# Patient Record
Sex: FEMALE | Race: OTHER | NOT HISPANIC OR LATINO
[De-identification: names, ages, dates, MRNs, and addresses within clinical notes are randomized per-mention and may not be internally consistent; named-entity substitution may affect disease eponyms.]

---

## 2017-01-25 ENCOUNTER — APPOINTMENT (OUTPATIENT)
Dept: NEPHROLOGY | Facility: CLINIC | Age: 22
End: 2017-01-25

## 2017-01-25 VITALS
HEART RATE: 87 BPM | SYSTOLIC BLOOD PRESSURE: 104 MMHG | BODY MASS INDEX: 24.61 KG/M2 | WEIGHT: 138.89 LBS | HEIGHT: 63 IN | DIASTOLIC BLOOD PRESSURE: 66 MMHG | OXYGEN SATURATION: 97 %

## 2017-01-25 DIAGNOSIS — R82.90 UNSPECIFIED ABNORMAL FINDINGS IN URINE: ICD-10-CM

## 2017-01-25 RX ORDER — FOLIC ACID 1 MG/1
1 TABLET ORAL
Qty: 30 | Refills: 0 | Status: ACTIVE | COMMUNITY
Start: 2017-01-05

## 2017-01-25 RX ORDER — RAMIPRIL 1.25 MG/1
1.25 CAPSULE ORAL
Qty: 30 | Refills: 0 | Status: ACTIVE | COMMUNITY
Start: 2016-09-19

## 2017-01-26 LAB
ALBUMIN SERPL ELPH-MCNC: 4 G/DL
ANION GAP SERPL CALC-SCNC: 12 MMOL/L
APPEARANCE: CLEAR
BACTERIA: ABNORMAL
BILIRUBIN URINE: NEGATIVE
BLOOD URINE: ABNORMAL
BUN SERPL-MCNC: 14 MG/DL
CALCIUM OXALATE CRYSTALS: ABNORMAL
CALCIUM SERPL-MCNC: 9.3 MG/DL
CHLORIDE SERPL-SCNC: 103 MMOL/L
CO2 SERPL-SCNC: 26 MMOL/L
COLOR: YELLOW
CREAT SERPL-MCNC: 0.52 MG/DL
CREAT SPEC-SCNC: 138 MG/DL
CREAT/PROT UR: 0.5 RATIO
GLUCOSE QUALITATIVE U: NORMAL MG/DL
GLUCOSE SERPL-MCNC: 111 MG/DL
HYALINE CASTS: 0 /LPF
INR PPP: 1.67 RATIO
KETONES URINE: NEGATIVE
LEUKOCYTE ESTERASE URINE: NEGATIVE
MICROSCOPIC-UA: NORMAL
NITRITE URINE: NEGATIVE
PH URINE: 6.5
PHOSPHATE SERPL-MCNC: 3.5 MG/DL
POTASSIUM SERPL-SCNC: 4.6 MMOL/L
PROT UR-MCNC: 75 MG/DL
PROTEIN URINE: 100 MG/DL
PT BLD: 19 SEC
RED BLOOD CELLS URINE: 8 /HPF
SODIUM SERPL-SCNC: 141 MMOL/L
SPECIFIC GRAVITY URINE: 1.02
SQUAMOUS EPITHELIAL CELLS: 4 /HPF
UROBILINOGEN URINE: NORMAL MG/DL
WHITE BLOOD CELLS URINE: 8 /HPF

## 2017-01-27 LAB — BACTERIA UR CULT: ABNORMAL

## 2017-02-13 ENCOUNTER — OTHER (OUTPATIENT)
Age: 22
End: 2017-02-13

## 2017-02-13 RX ORDER — MYCOPHENOLATE MOFETIL 500 MG/1
500 TABLET ORAL TWICE DAILY
Qty: 120 | Refills: 2 | Status: ACTIVE | COMMUNITY
Start: 2016-09-19 | End: 1900-01-01

## 2017-04-26 ENCOUNTER — APPOINTMENT (OUTPATIENT)
Dept: MRI IMAGING | Facility: CLINIC | Age: 22
End: 2017-04-26

## 2017-04-26 ENCOUNTER — OUTPATIENT (OUTPATIENT)
Dept: OUTPATIENT SERVICES | Facility: HOSPITAL | Age: 22
LOS: 1 days | End: 2017-04-26
Payer: COMMERCIAL

## 2017-04-26 DIAGNOSIS — Z00.8 ENCOUNTER FOR OTHER GENERAL EXAMINATION: ICD-10-CM

## 2017-04-26 PROCEDURE — A9585: CPT

## 2017-04-26 PROCEDURE — 82565 ASSAY OF CREATININE: CPT

## 2017-04-26 PROCEDURE — 70553 MRI BRAIN STEM W/O & W/DYE: CPT

## 2017-05-30 ENCOUNTER — APPOINTMENT (OUTPATIENT)
Dept: NEPHROLOGY | Facility: CLINIC | Age: 22
End: 2017-05-30

## 2017-05-30 VITALS
WEIGHT: 130.07 LBS | OXYGEN SATURATION: 98 % | HEIGHT: 63 IN | DIASTOLIC BLOOD PRESSURE: 82 MMHG | HEART RATE: 83 BPM | BODY MASS INDEX: 23.05 KG/M2 | SYSTOLIC BLOOD PRESSURE: 107 MMHG

## 2017-05-30 DIAGNOSIS — M32.14 GLOMERULAR DISEASE IN SYSTEMIC LUPUS ERYTHEMATOSUS: ICD-10-CM

## 2017-05-30 DIAGNOSIS — R76.0 RAISED ANTIBODY TITER: ICD-10-CM

## 2017-05-30 RX ORDER — BUTALBITAL, ACETAMINOPHEN AND CAFFEINE 325; 50; 40 MG/1; MG/1; MG/1
50-325-40 TABLET ORAL
Qty: 40 | Refills: 0 | Status: ACTIVE | COMMUNITY
Start: 2017-05-30

## 2017-05-31 LAB
ALBUMIN SERPL ELPH-MCNC: 4 G/DL
ANION GAP SERPL CALC-SCNC: 18 MMOL/L
APPEARANCE: ABNORMAL
BACTERIA: ABNORMAL
BASOPHILS # BLD AUTO: 0.02 K/UL
BASOPHILS NFR BLD AUTO: 0.4 %
BILIRUBIN URINE: NEGATIVE
BLOOD URINE: ABNORMAL
BUN SERPL-MCNC: 13 MG/DL
CALCIUM OXALATE CRYSTALS: ABNORMAL
CALCIUM SERPL-MCNC: 9.4 MG/DL
CHLORIDE SERPL-SCNC: 101 MMOL/L
CO2 SERPL-SCNC: 23 MMOL/L
COLOR: ABNORMAL
CREAT SERPL-MCNC: 0.61 MG/DL
CREAT SPEC-SCNC: 372 MG/DL
CREAT/PROT UR: 0.3 RATIO
EOSINOPHIL # BLD AUTO: 0.1 K/UL
EOSINOPHIL NFR BLD AUTO: 1.8 %
GLUCOSE QUALITATIVE U: NORMAL MG/DL
GLUCOSE SERPL-MCNC: 76 MG/DL
GRANULAR CASTS: 1 /LPF
HCT VFR BLD CALC: 44.1 %
HGB BLD-MCNC: 14.5 G/DL
HYALINE CASTS: 1 /LPF
IMM GRANULOCYTES NFR BLD AUTO: 0.5 %
INR PPP: 2.14 RATIO
KETONES URINE: ABNORMAL
LEUKOCYTE ESTERASE URINE: NEGATIVE
LYMPHOCYTES # BLD AUTO: 2.38 K/UL
LYMPHOCYTES NFR BLD AUTO: 42.2 %
MAN DIFF?: NORMAL
MCHC RBC-ENTMCNC: 29.9 PG
MCHC RBC-ENTMCNC: 32.9 GM/DL
MCV RBC AUTO: 90.9 FL
MICROSCOPIC-UA: NORMAL
MONOCYTES # BLD AUTO: 0.56 K/UL
MONOCYTES NFR BLD AUTO: 9.9 %
NEUTROPHILS # BLD AUTO: 2.55 K/UL
NEUTROPHILS NFR BLD AUTO: 45.2 %
NITRITE URINE: NEGATIVE
PH URINE: 6
PHOSPHATE SERPL-MCNC: 3.1 MG/DL
PLATELET # BLD AUTO: 325 K/UL
POTASSIUM SERPL-SCNC: 4.4 MMOL/L
PROT UR-MCNC: 104 MG/DL
PROTEIN URINE: 300 MG/DL
PT BLD: 24.6 SEC
RBC # BLD: 4.85 M/UL
RBC # FLD: 13.2 %
RED BLOOD CELLS URINE: 32 /HPF
SODIUM SERPL-SCNC: 142 MMOL/L
SPECIFIC GRAVITY URINE: 1.04
SQUAMOUS EPITHELIAL CELLS: 12 /HPF
UROBILINOGEN URINE: 1 MG/DL
WBC # FLD AUTO: 5.64 K/UL
WHITE BLOOD CELLS URINE: 66 /HPF

## 2017-06-01 LAB
ALBUMIN SERPL ELPH-MCNC: 4 G/DL
ALP BLD-CCNC: 55 U/L
ALT SERPL-CCNC: 22 U/L
AST SERPL-CCNC: 20 U/L
BILIRUB DIRECT SERPL-MCNC: 0 MG/DL
BILIRUB INDIRECT SERPL-MCNC: NORMAL
BILIRUB SERPL-MCNC: <0.2 MG/DL
PROT SERPL-MCNC: 6.5 G/DL

## 2020-11-11 ENCOUNTER — EMERGENCY (EMERGENCY)
Facility: HOSPITAL | Age: 25
LOS: 1 days | Discharge: HOME | End: 2020-11-11
Attending: EMERGENCY MEDICINE | Admitting: EMERGENCY MEDICINE
Payer: COMMERCIAL

## 2020-11-11 VITALS
SYSTOLIC BLOOD PRESSURE: 108 MMHG | HEIGHT: 63.5 IN | RESPIRATION RATE: 16 BRPM | TEMPERATURE: 98 F | HEART RATE: 83 BPM | DIASTOLIC BLOOD PRESSURE: 68 MMHG | OXYGEN SATURATION: 100 %

## 2020-11-11 PROCEDURE — 99283 EMERGENCY DEPT VISIT LOW MDM: CPT

## 2020-11-11 RX ORDER — DIAZEPAM 5 MG
1 TABLET ORAL
Qty: 10 | Refills: 0
Start: 2020-11-11 | End: 2020-11-15

## 2020-11-11 RX ORDER — DIAZEPAM 5 MG
10 TABLET ORAL ONCE
Refills: 0 | Status: DISCONTINUED | OUTPATIENT
Start: 2020-11-11 | End: 2020-11-11

## 2020-11-11 RX ORDER — IBUPROFEN 200 MG
600 TABLET ORAL ONCE
Refills: 0 | Status: COMPLETED | OUTPATIENT
Start: 2020-11-11 | End: 2020-11-11

## 2020-11-11 RX ORDER — LIDOCAINE 4 G/100G
1 CREAM TOPICAL ONCE
Refills: 0 | Status: COMPLETED | OUTPATIENT
Start: 2020-11-11 | End: 2020-11-11

## 2020-11-11 RX ORDER — ACETAMINOPHEN 500 MG
975 TABLET ORAL ONCE
Refills: 0 | Status: COMPLETED | OUTPATIENT
Start: 2020-11-11 | End: 2020-11-11

## 2020-11-11 RX ADMIN — Medication 10 MILLIGRAM(S): at 16:18

## 2020-11-11 RX ADMIN — LIDOCAINE 1 PATCH: 4 CREAM TOPICAL at 16:18

## 2020-11-11 RX ADMIN — Medication 975 MILLIGRAM(S): at 16:18

## 2020-11-11 RX ADMIN — Medication 600 MILLIGRAM(S): at 16:18

## 2020-11-11 NOTE — ED PROVIDER NOTE - NS ED ROS FT
General: denies fever, chills, weight loss/weight gain.  HENT: denies nasal congestion, sore throat, rhinorrhea, ear pain.  Eyes: denies visual changes, blurred vision, eye discharge, eye redness.  Neck: denies neck pain, neck swelling.  CV: denies chest pain, palpitations.  Resp: denies difficulty breathing, cough.  Abdominal: denies nausea, vomiting, diarrhea, abdominal pain, blood in stool, dark stool.  MSK: low back pain.  Neuro: denies headaches, numbness, tingling, dizziness, lightheadedness.  Skin: denies rashes, cuts, bruises.  Hematologic: denies unexplained bruises.

## 2020-11-11 NOTE — ED PROVIDER NOTE - PROGRESS NOTE DETAILS
Asaf PGY2 - Reassessed pt, who is feeling improved.  Will send a few days of valium for breakthrough pain.  Instructed pt. to take tylenol, motrin, and lidoderm.  Pt. aware and agrees w/ plan.  Will f/u w/ rheumatologist.  All other questions and concerns have been addressed.  Pt. will be dc/d.

## 2020-11-11 NOTE — ED ADULT TRIAGE NOTE - CHIEF COMPLAINT QUOTE
pt here for left sided lower back pain x 2weeks. pt with hx of lupus, saw her rheumatologist and given prednisone and lyrica, pain has persisted. here for further evaluation. no fevers

## 2020-11-11 NOTE — ED ADULT NURSE NOTE - OBJECTIVE STATEMENT
Pt. is A&Ox3 presents to ER c/o lower back pain. Pt. states this happens once a year from lupus. Pt. states pain worsened over the night, was not bearable. Pt. respirations even and unlabored, abdomen nontender and nondistended, skin intact. Pt. medicated as ordered, will CTM.

## 2020-11-11 NOTE — ED PROVIDER NOTE - PATIENT PORTAL LINK FT
You can access the FollowMyHealth Patient Portal offered by [facility] by registering at the following website: [email or web address]. By joining [email or web address]’s FollowMyHealth portal, you will also be able to view your health information using other applications (apps) compatible with our system.

## 2020-11-11 NOTE — ED PROVIDER NOTE - CLINICAL SUMMARY MEDICAL DECISION MAKING FREE TEXT BOX
25 y.o. F p/w low back pain.  Pain is similar in quality to previous lupus flare pain.  Took tylenol this morning w/o much relief.  Ambulating w/ mild difficulty, likely 2/2 pain.  Will give PO analgesics, reassess, and dc w/ rheum f/u if feeling better.

## 2020-11-11 NOTE — ED PROVIDER NOTE - PHYSICAL EXAMINATION
GENERAL: Patient awake alert NAD.  HEENT: NC/AT.  LUNGS: CTAB, no wheezes or crackles.  CARDIAC: RRR, no m/r/g.  ABDOMEN: Soft, NT, ND, No rebound, guarding.  EXT: No edema. No calf tenderness. CV 2+DP/PT bilaterally.  MSK: +b/l straight leg raise. No spinal tenderness, no vertebral step-offs, no pain with movement, no deformities.  NEURO: A&Ox3. Moving all extremities. Ambulating w/ mild difficulty.  SKIN: Warm and dry. No rash.  PSYCH: Normal affect.

## 2020-11-11 NOTE — ED PROVIDER NOTE - CARE PLAN
Principal Discharge DX:	Low back pain   Principal Discharge DX:	Low back pain  Secondary Diagnosis:	Systemic lupus erythematosus, unspecified SLE type, unspecified organ involvement status

## 2020-11-11 NOTE — ED PROVIDER NOTE - NSFOLLOWUPINSTRUCTIONS_ED_ALL_ED_FT
You were seen for low back pain.    This is due to your lupus flare.    Take tylenol 1000mg and motrin 400mg every 6 hours for pain.  Eat some food with the motrin.  You can also buy a lidocaine patch from any pharmacy over the counter.   You can use one patch for 12 hours.    I have also prescribed valium that you can use for breakthrough pain.  Do not operate any heavy machinery or  after taking this, as this medication can make you drowsy.    Back pain is very common in adults. The cause of back pain is rarely dangerous and the pain often gets better over time. The cause of your back pain may not be known and may include strain of muscles or ligaments, degeneration of the spinal disks, or arthritis. Occasionally the pain may radiate down your leg(s). Over-the-counter medicines to reduce pain and inflammation are often the most helpful. Stretching and remaining active frequently helps the healing process.     SEEK IMMEDIATE MEDICAL CARE IF YOU HAVE ANY OF THE FOLLOWING SYMPTOMS: bowel or bladder control problems, unusual weakness or numbness in your arms or legs, nausea or vomiting, abdominal pain, fever, dizziness/lightheadedness.

## 2020-11-11 NOTE — ED PROVIDER NOTE - ATTENDING CONTRIBUTION TO CARE
I have seen and examined the patient on the patient´s visit date. I have reviewed the note written by Suleman Ron DO  on that visit day. I have supervised and participated as necessary in the performance of procedures indicated for patient management and was available at all phases of the patient´s visit when needed. We discussed the history, physical exam findings, management plan, and  medical decision making. I have made my additions, exceptions, and revisions within the chart and I agree with H and P as documented in its entirety. The data and my interpretation of any data collected from labs, interventions and imaging appear below as well as my independent medical decision making and considerations    The patient is a 25y Female who has a past medical and surgery history of  migraine and SLE PTED with flare c/w prior Lupus Flares in the past which manifest with back pain myalgias and as described; pt says current symptoms are exactly the same as previous flares  Vital Signs Stable  PE: as described; my additions and exceptions are noted in the chart    IMPRESSION/RISK:  Dx=LUPUS flare; patient appears well and responded extremely well to meds   Plan  d/c with PO equivelents and outpt pcp/rheum followup  RTED PRN  Plan

## 2020-11-11 NOTE — ED PROVIDER NOTE - OBJECTIVE STATEMENT
25 y.o. F w/ PMHx of lupus p/w low back pain for over two weeks.  Pt. gets this pain once a year and usually is started on steroids and lyrica.  The steroids take a month to kick in and usually, she is able to tolerate the pain, but this year it has been intolerable.  Pt. called her rheumatologist who started her on methylprednisolone two weeks ago and then switched to prednisone last week.  Pt. was also started on lyrica last week.  States pain is similar in quality to previous pain, just more intense.  Pt. takes 1000mg extra strength tylenol every 4-6 hours, which has not provided much relief this year.  Denies any trauma to the area, saddle anesthesia, urinary/fecal incontinence, f/c, IVDA.  PT. has an appt w/ her rheumatologist next week.

## 2021-05-21 ENCOUNTER — EMERGENCY (EMERGENCY)
Facility: HOSPITAL | Age: 26
LOS: 1 days | Discharge: STILL ADMITTED OR ADMINISTRATIVE | End: 2021-05-21
Admitting: EMERGENCY MEDICINE
Payer: COMMERCIAL

## 2021-05-21 PROCEDURE — L9991: CPT

## 2021-07-01 ENCOUNTER — APPOINTMENT (OUTPATIENT)
Dept: RHEUMATOLOGY | Facility: CLINIC | Age: 26
End: 2021-07-01

## 2021-07-07 ENCOUNTER — NON-APPOINTMENT (OUTPATIENT)
Age: 26
End: 2021-07-07

## 2021-07-07 ENCOUNTER — APPOINTMENT (OUTPATIENT)
Dept: CARDIOLOGY | Facility: CLINIC | Age: 26
End: 2021-07-07
Payer: COMMERCIAL

## 2021-07-07 VITALS
WEIGHT: 152 LBS | HEIGHT: 63 IN | OXYGEN SATURATION: 97 % | TEMPERATURE: 98.5 F | BODY MASS INDEX: 26.93 KG/M2 | SYSTOLIC BLOOD PRESSURE: 100 MMHG | HEART RATE: 85 BPM | DIASTOLIC BLOOD PRESSURE: 70 MMHG

## 2021-07-07 DIAGNOSIS — G47.00 INSOMNIA, UNSPECIFIED: ICD-10-CM

## 2021-07-07 DIAGNOSIS — R06.02 SHORTNESS OF BREATH: ICD-10-CM

## 2021-07-07 DIAGNOSIS — Z82.49 FAMILY HISTORY OF ISCHEMIC HEART DISEASE AND OTHER DISEASES OF THE CIRCULATORY SYSTEM: ICD-10-CM

## 2021-07-07 DIAGNOSIS — I82.409 ACUTE EMBOLISM AND THROMBOSIS OF UNSPECIFIED DEEP VEINS OF UNSPECIFIED LOWER EXTREMITY: ICD-10-CM

## 2021-07-07 DIAGNOSIS — Z87.448 PERSONAL HISTORY OF OTHER DISEASES OF URINARY SYSTEM: ICD-10-CM

## 2021-07-07 DIAGNOSIS — Z13.228 ENCOUNTER FOR SCREENING FOR OTHER METABOLIC DISORDERS: ICD-10-CM

## 2021-07-07 DIAGNOSIS — Z83.79 FAMILY HISTORY OF OTHER DISEASES OF THE DIGESTIVE SYSTEM: ICD-10-CM

## 2021-07-07 DIAGNOSIS — R42 DIZZINESS AND GIDDINESS: ICD-10-CM

## 2021-07-07 DIAGNOSIS — Z86.59 PERSONAL HISTORY OF OTHER MENTAL AND BEHAVIORAL DISORDERS: ICD-10-CM

## 2021-07-07 DIAGNOSIS — F41.8 OTHER SPECIFIED ANXIETY DISORDERS: ICD-10-CM

## 2021-07-07 DIAGNOSIS — Z82.3 FAMILY HISTORY OF STROKE: ICD-10-CM

## 2021-07-07 DIAGNOSIS — Z87.39 PERSONAL HISTORY OF OTHER DISEASES OF THE MUSCULOSKELETAL SYSTEM AND CONNECTIVE TISSUE: ICD-10-CM

## 2021-07-07 DIAGNOSIS — Z86.718 PERSONAL HISTORY OF OTHER VENOUS THROMBOSIS AND EMBOLISM: ICD-10-CM

## 2021-07-07 DIAGNOSIS — M32.9 SYSTEMIC LUPUS ERYTHEMATOSUS, UNSPECIFIED: ICD-10-CM

## 2021-07-07 DIAGNOSIS — Z86.39 PERSONAL HISTORY OF OTHER ENDOCRINE, NUTRITIONAL AND METABOLIC DISEASE: ICD-10-CM

## 2021-07-07 DIAGNOSIS — Z83.438 FAMILY HISTORY OF OTHER DISORDER OF LIPOPROTEIN METABOLISM AND OTHER LIPIDEMIA: ICD-10-CM

## 2021-07-07 DIAGNOSIS — M79.7 FIBROMYALGIA: ICD-10-CM

## 2021-07-07 DIAGNOSIS — E55.9 VITAMIN D DEFICIENCY, UNSPECIFIED: ICD-10-CM

## 2021-07-07 PROCEDURE — 93000 ELECTROCARDIOGRAM COMPLETE: CPT

## 2021-07-07 PROCEDURE — 99214 OFFICE O/P EST MOD 30 MIN: CPT

## 2021-07-07 RX ORDER — PREGABALIN 100 MG/1
100 CAPSULE ORAL
Refills: 0 | Status: ACTIVE | COMMUNITY

## 2021-07-07 RX ORDER — TIZANIDINE HYDROCHLORIDE 4 MG/1
4 CAPSULE ORAL
Refills: 0 | Status: ACTIVE | COMMUNITY

## 2021-07-07 RX ORDER — ATORVASTATIN CALCIUM 20 MG/1
20 TABLET, FILM COATED ORAL
Refills: 0 | Status: ACTIVE | COMMUNITY

## 2021-07-07 RX ORDER — ZOLPIDEM TARTRATE 10 MG/1
10 TABLET, FILM COATED ORAL
Refills: 0 | Status: ACTIVE | COMMUNITY

## 2021-07-07 NOTE — REVIEW OF SYSTEMS
[SOB] : shortness of breath [Dyspnea on exertion] : dyspnea during exertion [Dizziness] : dizziness [Negative] : Heme/Lymph [Chest Discomfort] : no chest discomfort [Lower Ext Edema] : no extremity edema [Leg Claudication] : no intermittent leg claudication [Palpitations] : no palpitations [Orthopnea] : no orthopnea [PND] : no PND [Tremor] : no tremor was seen [Numbness (Hypoesthesia)] : no numbness [Convulsions] : no convulsions [Tingling (Paresthesia)] : no tingling [Weakness] : no weakness [Limb Weakness (Paresis)] : no limb weakness (Paresis) [Speech Disturbance] : no speech disturbance

## 2021-07-07 NOTE — HISTORY OF PRESENT ILLNESS
[FreeTextEntry1] : This is a 26 year old lady with a PMH of HLD, HTN, History of DVT, Depression/Anxiety, fibromyalgia, Lupus, insomnia, and vitamin D deficiency presents today for cardiac evaluation. Patient states that she was sent by her PMD and states that she needs a cardiac workup. Patient states that she has been experiencing shortness of breath on exertion when climbing stairs. Patient also notes that she has been feeling dizzy recently. Patient's lab work was reviewed. Patient denies palpitations, chest pain, nausea, and  vomiting.

## 2021-07-12 ENCOUNTER — APPOINTMENT (OUTPATIENT)
Dept: RHEUMATOLOGY | Facility: CLINIC | Age: 26
End: 2021-07-12
Payer: COMMERCIAL

## 2021-07-12 VITALS
SYSTOLIC BLOOD PRESSURE: 90 MMHG | WEIGHT: 153 LBS | RESPIRATION RATE: 17 BRPM | HEART RATE: 87 BPM | HEIGHT: 63 IN | BODY MASS INDEX: 27.11 KG/M2 | OXYGEN SATURATION: 98 % | TEMPERATURE: 97.8 F | DIASTOLIC BLOOD PRESSURE: 60 MMHG

## 2021-07-12 DIAGNOSIS — M32.9 SYSTEMIC LUPUS ERYTHEMATOSUS, UNSPECIFIED: ICD-10-CM

## 2021-07-12 DIAGNOSIS — G89.29 DORSALGIA, UNSPECIFIED: ICD-10-CM

## 2021-07-12 DIAGNOSIS — M32.14 GLOMERULAR DISEASE IN SYSTEMIC LUPUS ERYTHEMATOSUS: ICD-10-CM

## 2021-07-12 DIAGNOSIS — M54.9 DORSALGIA, UNSPECIFIED: ICD-10-CM

## 2021-07-12 DIAGNOSIS — D68.61 ANTIPHOSPHOLIPID SYNDROME: ICD-10-CM

## 2021-07-12 DIAGNOSIS — Z79.01 LONG TERM (CURRENT) USE OF ANTICOAGULANTS: ICD-10-CM

## 2021-07-12 PROCEDURE — 99205 OFFICE O/P NEW HI 60 MIN: CPT

## 2021-07-12 NOTE — HISTORY OF PRESENT ILLNESS
[FreeTextEntry1] : 1.  SLE: Diagnosed around 2013 when she initially developed what was diagnosed as plantar fasciitis.  She subsequently developed arthritis and a DVT. Notes in this record discuss the presence of a LAC.  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 She was followed by [facility] 2021 on warfarin, hydroxychloroquine, and MMF.  She was briefly followed by Nephrology at our institution. In 2021 she was most bothered by low back pain.  No rash, inflammatory arthritis. \par 2.  History of APS: chronic treatment with warfarin\par 3.  Back pain: no radicular symptoms\par 4.  Fibromyalgia\par \par Meds\par hydroxychloroquine 400 mg/d\par warfarin 5 mg/d\par  mg 2xd\par ramipril 1.25 mg/d\par Lyrica 100 mg 3xd\par atorvastatin 20 mg/d\par tizanidine\par \par Vaccines

## 2021-07-12 NOTE — ASSESSMENT
[FreeTextEntry1] : 1.  SLE: Diagnosed around 2013 when she initially developed what was diagnosed as plantar fasciitis.  She subsequently developed arthritis and a DVT. Notes in this record discuss the presence of a LAC.  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 She was followed by [facility] 2021 on warfarin, hydroxychloroquine, and MMF.  She was briefly followed by Nephrology at our institution. In 2021 she was most bothered by low back pain.  No rash, inflammatory arthritis. \par 2.  History of APS: chronic treatment with warfarin\par 3.  Back pain: no radicular symptoms\par 4.  Fibromyalgia\par \par Plan:\par 1.  Extensive lab tests\par 2.  Contact me one week\par 3.  Return one month\par 4.  Address vaccines

## 2021-07-12 NOTE — PHYSICAL EXAM
[General Appearance - Alert] : alert [General Appearance - In No Acute Distress] : in no acute distress [General Appearance - Well-Appearing] : healthy appearing [Sclera] : the sclera and conjunctiva were normal [Strabismus] : no strabismus was seen [Outer Ear] : the ears and nose were normal in appearance [Neck Appearance] : the appearance of the neck was normal [Neck Cervical Mass (___cm)] : no neck mass was observed [Jugular Venous Distention Increased] : there was no jugular-venous distention [Thyroid Diffuse Enlargement] : the thyroid was not enlarged [Thyroid Nodule] : there were no palpable thyroid nodules [Auscultation Breath Sounds / Voice Sounds] : lungs were clear to auscultation bilaterally [Heart Rate And Rhythm] : heart rate was normal and rhythm regular [Heart Sounds] : normal S1 and S2 [Heart Sounds Gallop] : no gallops [Murmurs] : no murmurs [Heart Sounds Pericardial Friction Rub] : no pericardial rub [Full Pulse] : the pedal pulses are present [Edema] : there was no peripheral edema [Veins - Varicosity Changes] : there were no varicosital changes [Bowel Sounds] : normal bowel sounds [Abdomen Soft] : soft [Abdomen Tenderness] : non-tender [Abdomen Mass (___ Cm)] : no abdominal mass palpated [Cervical Lymph Nodes Enlarged Posterior Bilaterally] : posterior cervical [Cervical Lymph Nodes Enlarged Anterior Bilaterally] : anterior cervical [Supraclavicular Lymph Nodes Enlarged Bilaterally] : supraclavicular [No CVA Tenderness] : no ~M costovertebral angle tenderness [No Spinal Tenderness] : no spinal tenderness [Abnormal Walk] : normal gait [Nail Clubbing] : no clubbing  or cyanosis of the fingernails [Musculoskeletal - Swelling] : no joint swelling seen [Motor Tone] : muscle strength and tone were normal [] : no rash [Sensation] : the sensory exam was normal to light touch and pinprick [Motor Exam] : the motor exam was normal [Oriented To Time, Place, And Person] : oriented to person, place, and time [Impaired Insight] : insight and judgment were intact [Affect] : the affect was normal

## 2021-07-13 LAB
ALBUMIN SERPL ELPH-MCNC: 4.7 G/DL
ALP BLD-CCNC: 53 U/L
ALT SERPL-CCNC: 17 U/L
ANION GAP SERPL CALC-SCNC: 13 MMOL/L
APPEARANCE: CLEAR
APTT BLD: 50.3 SEC
AST SERPL-CCNC: 20 U/L
BACTERIA: ABNORMAL
BASOPHILS # BLD AUTO: 0.05 K/UL
BASOPHILS NFR BLD AUTO: 0.9 %
BILIRUB SERPL-MCNC: 0.2 MG/DL
BILIRUBIN URINE: NEGATIVE
BLOOD URINE: NORMAL
BUN SERPL-MCNC: 10 MG/DL
C3 SERPL-MCNC: 124 MG/DL
C4 SERPL-MCNC: 25 MG/DL
CALCIUM SERPL-MCNC: 9.5 MG/DL
CCP AB SER IA-ACNC: <8 UNITS
CHLORIDE SERPL-SCNC: 103 MMOL/L
CK SERPL-CCNC: 128 U/L
CO2 SERPL-SCNC: 23 MMOL/L
COLOR: NORMAL
CONFIRM: 45.5 SEC
CREAT SERPL-MCNC: 0.54 MG/DL
CREAT SPEC-SCNC: 116 MG/DL
CREAT/PROT UR: 0.1 RATIO
DRVVT 1H NP PPP: 42.9 SEC
DRVVT IMM 1:2 NP PPP: NORMAL
DRVVT SCREEN TO CONFIRM RATIO: 0.94 RATIO
DSDNA AB SER-ACNC: 17 IU/ML
EOSINOPHIL # BLD AUTO: 0.15 K/UL
EOSINOPHIL NFR BLD AUTO: 2.8 %
GLUCOSE QUALITATIVE U: NEGATIVE
HCT VFR BLD CALC: 41.1 %
HGB BLD-MCNC: 13.2 G/DL
HYALINE CASTS: 2 /LPF
IMM GRANULOCYTES NFR BLD AUTO: 0.4 %
INR PPP: 2.47 RATIO
KETONES URINE: NEGATIVE
LEUKOCYTE ESTERASE URINE: ABNORMAL
LYMPHOCYTES # BLD AUTO: 1.78 K/UL
LYMPHOCYTES NFR BLD AUTO: 33 %
MAN DIFF?: NORMAL
MCHC RBC-ENTMCNC: 29.5 PG
MCHC RBC-ENTMCNC: 32.1 GM/DL
MCV RBC AUTO: 91.9 FL
MICROSCOPIC-UA: NORMAL
MONOCYTES # BLD AUTO: 0.46 K/UL
MONOCYTES NFR BLD AUTO: 8.5 %
NEUTROPHILS # BLD AUTO: 2.93 K/UL
NEUTROPHILS NFR BLD AUTO: 54.4 %
NITRITE URINE: NEGATIVE
PH URINE: 6
PLATELET # BLD AUTO: 281 K/UL
POTASSIUM SERPL-SCNC: 4.3 MMOL/L
PROT SERPL-MCNC: 6.8 G/DL
PROT UR-MCNC: 9 MG/DL
PROTEIN URINE: ABNORMAL
PT BLD: 28.1 SEC
RBC # BLD: 4.47 M/UL
RBC # FLD: 13.1 %
RED BLOOD CELLS URINE: 2 /HPF
RF+CCP IGG SER-IMP: NEGATIVE
RHEUMATOID FACT SER QL: <10 IU/ML
SCREEN DRVVT: 57.1 SEC
SILICA CLOTTING TIME INTERPRETATION: NORMAL
SILICA CLOTTING TIME S/C: 0.96 RATIO
SODIUM SERPL-SCNC: 139 MMOL/L
SPECIFIC GRAVITY URINE: 1.02
SQUAMOUS EPITHELIAL CELLS: 5 /HPF
TSH SERPL-ACNC: 1.1 UIU/ML
UROBILINOGEN URINE: NORMAL
WBC # FLD AUTO: 5.39 K/UL
WHITE BLOOD CELLS URINE: 24 /HPF

## 2021-07-14 LAB
B2 GLYCOPROT1 IGA SERPL IA-ACNC: 7.1 SAU
B2 GLYCOPROT1 IGG SER-ACNC: <5 SGU
B2 GLYCOPROT1 IGM SER-ACNC: <5 SMU
CARDIOLIPIN IGM SER-MCNC: 9.3 MPL
CARDIOLIPIN IGM SER-MCNC: <5 GPL
DEPRECATED CARDIOLIPIN IGA SER: <5 APL

## 2021-07-18 LAB
ENA RNP AB SER IA-ACNC: <0.2 AL
ENA SM AB SER IA-ACNC: <0.2 AL
ENA SS-A AB SER IA-ACNC: <0.2 AL
ENA SS-B AB SER IA-ACNC: <0.2 AL

## 2021-07-19 ENCOUNTER — APPOINTMENT (OUTPATIENT)
Dept: CARDIOLOGY | Facility: CLINIC | Age: 26
End: 2021-07-19

## 2021-07-19 ENCOUNTER — NON-APPOINTMENT (OUTPATIENT)
Age: 26
End: 2021-07-19

## 2021-07-21 LAB
MISCELLANEOUS TEST: NORMAL
PROC NAME: NORMAL

## 2021-07-25 LAB
MISCELLANEOUS TEST: NORMAL
PROC NAME: NORMAL

## 2021-07-29 ENCOUNTER — APPOINTMENT (OUTPATIENT)
Dept: CARDIOLOGY | Facility: CLINIC | Age: 26
End: 2021-07-29

## 2021-08-03 ENCOUNTER — APPOINTMENT (OUTPATIENT)
Dept: RHEUMATOLOGY | Facility: CLINIC | Age: 26
End: 2021-08-03

## 2025-01-03 ENCOUNTER — INPATIENT (INPATIENT)
Facility: HOSPITAL | Age: 30
LOS: 1 days | Discharge: HOME | DRG: 101 | End: 2025-01-05
Attending: STUDENT IN AN ORGANIZED HEALTH CARE EDUCATION/TRAINING PROGRAM | Admitting: STUDENT IN AN ORGANIZED HEALTH CARE EDUCATION/TRAINING PROGRAM
Payer: COMMERCIAL

## 2025-01-03 VITALS
HEART RATE: 86 BPM | RESPIRATION RATE: 18 BRPM | SYSTOLIC BLOOD PRESSURE: 105 MMHG | TEMPERATURE: 98 F | OXYGEN SATURATION: 94 % | HEIGHT: 63 IN | WEIGHT: 134.92 LBS | DIASTOLIC BLOOD PRESSURE: 73 MMHG

## 2025-01-03 DIAGNOSIS — R56.9 UNSPECIFIED CONVULSIONS: ICD-10-CM

## 2025-01-03 LAB
ALBUMIN SERPL ELPH-MCNC: 4.4 G/DL — SIGNIFICANT CHANGE UP (ref 3.3–5)
ALP SERPL-CCNC: 67 U/L — SIGNIFICANT CHANGE UP (ref 40–120)
ALT FLD-CCNC: 17 U/L — SIGNIFICANT CHANGE UP (ref 10–45)
ANION GAP SERPL CALC-SCNC: 13 MMOL/L — SIGNIFICANT CHANGE UP (ref 5–17)
APTT BLD: 43.9 SEC — HIGH (ref 24.5–35.6)
AST SERPL-CCNC: 16 U/L — SIGNIFICANT CHANGE UP (ref 10–40)
BASOPHILS # BLD AUTO: 0.06 K/UL — SIGNIFICANT CHANGE UP (ref 0–0.2)
BASOPHILS NFR BLD AUTO: 1.2 % — SIGNIFICANT CHANGE UP (ref 0–2)
BILIRUB SERPL-MCNC: <0.1 MG/DL — LOW (ref 0.2–1.2)
BUN SERPL-MCNC: 20 MG/DL — SIGNIFICANT CHANGE UP (ref 7–23)
CALCIUM SERPL-MCNC: 9.1 MG/DL — SIGNIFICANT CHANGE UP (ref 8.4–10.5)
CHLORIDE SERPL-SCNC: 102 MMOL/L — SIGNIFICANT CHANGE UP (ref 96–108)
CO2 SERPL-SCNC: 25 MMOL/L — SIGNIFICANT CHANGE UP (ref 22–31)
CREAT SERPL-MCNC: 0.56 MG/DL — SIGNIFICANT CHANGE UP (ref 0.5–1.3)
EGFR: 126 ML/MIN/1.73M2 — SIGNIFICANT CHANGE UP
EOSINOPHIL # BLD AUTO: 0.14 K/UL — SIGNIFICANT CHANGE UP (ref 0–0.5)
EOSINOPHIL NFR BLD AUTO: 2.7 % — SIGNIFICANT CHANGE UP (ref 0–6)
GAS PNL BLDV: SIGNIFICANT CHANGE UP
GLUCOSE SERPL-MCNC: 104 MG/DL — HIGH (ref 70–99)
HCG SERPL-ACNC: <2 MIU/ML — SIGNIFICANT CHANGE UP
HCT VFR BLD CALC: 39.3 % — SIGNIFICANT CHANGE UP (ref 34.5–45)
HGB BLD-MCNC: 12.5 G/DL — SIGNIFICANT CHANGE UP (ref 11.5–15.5)
IMM GRANULOCYTES NFR BLD AUTO: 0.8 % — SIGNIFICANT CHANGE UP (ref 0–0.9)
INR BLD: 2.31 RATIO — HIGH (ref 0.85–1.16)
LYMPHOCYTES # BLD AUTO: 2.17 K/UL — SIGNIFICANT CHANGE UP (ref 1–3.3)
LYMPHOCYTES # BLD AUTO: 41.7 % — SIGNIFICANT CHANGE UP (ref 13–44)
MAGNESIUM SERPL-MCNC: 2.1 MG/DL — SIGNIFICANT CHANGE UP (ref 1.6–2.6)
MCHC RBC-ENTMCNC: 29.4 PG — SIGNIFICANT CHANGE UP (ref 27–34)
MCHC RBC-ENTMCNC: 31.8 G/DL — LOW (ref 32–36)
MCV RBC AUTO: 92.5 FL — SIGNIFICANT CHANGE UP (ref 80–100)
MONOCYTES # BLD AUTO: 0.44 K/UL — SIGNIFICANT CHANGE UP (ref 0–0.9)
MONOCYTES NFR BLD AUTO: 8.4 % — SIGNIFICANT CHANGE UP (ref 2–14)
NEUTROPHILS # BLD AUTO: 2.36 K/UL — SIGNIFICANT CHANGE UP (ref 1.8–7.4)
NEUTROPHILS NFR BLD AUTO: 45.2 % — SIGNIFICANT CHANGE UP (ref 43–77)
NRBC # BLD: 0 /100 WBCS — SIGNIFICANT CHANGE UP (ref 0–0)
PHOSPHATE SERPL-MCNC: 2.6 MG/DL — SIGNIFICANT CHANGE UP (ref 2.5–4.5)
PLATELET # BLD AUTO: 275 K/UL — SIGNIFICANT CHANGE UP (ref 150–400)
POTASSIUM SERPL-MCNC: 3.8 MMOL/L — SIGNIFICANT CHANGE UP (ref 3.5–5.3)
POTASSIUM SERPL-SCNC: 3.8 MMOL/L — SIGNIFICANT CHANGE UP (ref 3.5–5.3)
PROLACTIN SERPL-MCNC: 49.4 NG/ML — HIGH (ref 3.4–24.1)
PROT SERPL-MCNC: 6.8 G/DL — SIGNIFICANT CHANGE UP (ref 6–8.3)
PROTHROM AB SERPL-ACNC: 26.1 SEC — HIGH (ref 9.9–13.4)
RBC # BLD: 4.25 M/UL — SIGNIFICANT CHANGE UP (ref 3.8–5.2)
RBC # FLD: 14 % — SIGNIFICANT CHANGE UP (ref 10.3–14.5)
SODIUM SERPL-SCNC: 140 MMOL/L — SIGNIFICANT CHANGE UP (ref 135–145)
WBC # BLD: 5.21 K/UL — SIGNIFICANT CHANGE UP (ref 3.8–10.5)
WBC # FLD AUTO: 5.21 K/UL — SIGNIFICANT CHANGE UP (ref 3.8–10.5)

## 2025-01-03 PROCEDURE — 70450 CT HEAD/BRAIN W/O DYE: CPT | Mod: 26

## 2025-01-03 PROCEDURE — 99222 1ST HOSP IP/OBS MODERATE 55: CPT | Mod: GC

## 2025-01-03 PROCEDURE — 99285 EMERGENCY DEPT VISIT HI MDM: CPT

## 2025-01-03 PROCEDURE — 71046 X-RAY EXAM CHEST 2 VIEWS: CPT | Mod: 26

## 2025-01-03 PROCEDURE — 95720 EEG PHY/QHP EA INCR W/VEEG: CPT

## 2025-01-03 RX ORDER — PREGABALIN 100 MG/1
1 CAPSULE ORAL
Refills: 0 | DISCHARGE

## 2025-01-03 RX ORDER — GINKGO BILOBA 40 MG
3 CAPSULE ORAL AT BEDTIME
Refills: 0 | Status: DISCONTINUED | OUTPATIENT
Start: 2025-01-03 | End: 2025-01-05

## 2025-01-03 RX ORDER — ACETAMINOPHEN 80 MG/.8ML
650 SOLUTION/ DROPS ORAL EVERY 6 HOURS
Refills: 0 | Status: DISCONTINUED | OUTPATIENT
Start: 2025-01-03 | End: 2025-01-05

## 2025-01-03 RX ORDER — LORAZEPAM 1 MG/1
2 TABLET ORAL ONCE
Refills: 0 | Status: DISCONTINUED | OUTPATIENT
Start: 2025-01-03 | End: 2025-01-05

## 2025-01-03 RX ORDER — ATORVASTATIN CALCIUM 40 MG/1
5 TABLET, FILM COATED ORAL AT BEDTIME
Refills: 0 | Status: DISCONTINUED | OUTPATIENT
Start: 2025-01-03 | End: 2025-01-03

## 2025-01-03 RX ORDER — LEVOTHYROXINE SODIUM 175 UG/1
50 TABLET ORAL DAILY
Refills: 0 | Status: DISCONTINUED | OUTPATIENT
Start: 2025-01-03 | End: 2025-01-05

## 2025-01-03 RX ORDER — ATORVASTATIN CALCIUM 40 MG/1
20 TABLET, FILM COATED ORAL AT BEDTIME
Refills: 0 | Status: DISCONTINUED | OUTPATIENT
Start: 2025-01-03 | End: 2025-01-05

## 2025-01-03 RX ORDER — LEVOTHYROXINE SODIUM 175 UG/1
1 TABLET ORAL
Refills: 0 | DISCHARGE

## 2025-01-03 RX ORDER — RAMIPRIL 5 MG/1
1 CAPSULE ORAL
Refills: 0 | DISCHARGE

## 2025-01-03 RX ORDER — HYDROXYCHLOROQUINE SULFATE 200 MG/1
200 TABLET ORAL DAILY
Refills: 0 | Status: DISCONTINUED | OUTPATIENT
Start: 2025-01-03 | End: 2025-01-05

## 2025-01-03 RX ORDER — LISINOPRIL 30 MG/1
5 TABLET ORAL DAILY
Refills: 0 | Status: DISCONTINUED | OUTPATIENT
Start: 2025-01-03 | End: 2025-01-05

## 2025-01-03 RX ORDER — ZOLPIDEM TARTRATE 5 MG
10 TABLET ORAL AT BEDTIME
Refills: 0 | Status: DISCONTINUED | OUTPATIENT
Start: 2025-01-03 | End: 2025-01-05

## 2025-01-03 RX ORDER — PREGABALIN 100 MG/1
200 CAPSULE ORAL EVERY 8 HOURS
Refills: 0 | Status: DISCONTINUED | OUTPATIENT
Start: 2025-01-03 | End: 2025-01-05

## 2025-01-03 RX ORDER — CHOLECALCIFEROL (VITAMIN D3) 10 MCG
1000 TABLET ORAL DAILY
Refills: 0 | Status: DISCONTINUED | OUTPATIENT
Start: 2025-01-03 | End: 2025-01-05

## 2025-01-03 RX ORDER — TRAMADOL HYDROCHLORIDE 50 MG/1
50 TABLET ORAL EVERY 8 HOURS
Refills: 0 | Status: DISCONTINUED | OUTPATIENT
Start: 2025-01-03 | End: 2025-01-05

## 2025-01-03 RX ORDER — LEVETIRACETAM 100 MG/ML
1000 SOLUTION ORAL ONCE
Refills: 0 | Status: DISCONTINUED | OUTPATIENT
Start: 2025-01-03 | End: 2025-01-03

## 2025-01-03 RX ORDER — WARFARIN SODIUM 10 MG/1
5 TABLET ORAL ONCE
Refills: 0 | Status: COMPLETED | OUTPATIENT
Start: 2025-01-03 | End: 2025-01-03

## 2025-01-03 RX ORDER — VITAMIN A 10000 UNIT
1 TABLET ORAL DAILY
Refills: 0 | Status: DISCONTINUED | OUTPATIENT
Start: 2025-01-03 | End: 2025-01-05

## 2025-01-03 RX ORDER — LEVETIRACETAM 100 MG/ML
1000 SOLUTION ORAL ONCE
Refills: 0 | Status: DISCONTINUED | OUTPATIENT
Start: 2025-01-03 | End: 2025-01-05

## 2025-01-03 RX ORDER — WARFARIN SODIUM 10 MG/1
5 TABLET ORAL AT BEDTIME
Refills: 0 | Status: DISCONTINUED | OUTPATIENT
Start: 2025-01-03 | End: 2025-01-03

## 2025-01-03 RX ADMIN — ATORVASTATIN CALCIUM 20 MILLIGRAM(S): 40 TABLET, FILM COATED ORAL at 21:15

## 2025-01-03 RX ADMIN — Medication 1 MILLIGRAM(S): at 10:20

## 2025-01-03 RX ADMIN — WARFARIN SODIUM 5 MILLIGRAM(S): 10 TABLET ORAL at 21:15

## 2025-01-03 RX ADMIN — HYDROXYCHLOROQUINE SULFATE 200 MILLIGRAM(S): 200 TABLET ORAL at 12:43

## 2025-01-03 RX ADMIN — PREGABALIN 200 MILLIGRAM(S): 100 CAPSULE ORAL at 16:03

## 2025-01-03 RX ADMIN — PREGABALIN 200 MILLIGRAM(S): 100 CAPSULE ORAL at 21:15

## 2025-01-03 RX ADMIN — Medication 10 MILLIGRAM(S): at 22:18

## 2025-01-03 RX ADMIN — LISINOPRIL 5 MILLIGRAM(S): 30 TABLET ORAL at 16:03

## 2025-01-03 RX ADMIN — Medication 1000 UNIT(S): at 12:43

## 2025-01-03 NOTE — ED ADULT NURSE REASSESSMENT NOTE - NS ED NURSE REASSESS COMMENT FT1
Pt AAOx4, NAD, resp nonlabored, skin warm/dry, resting comfortably in bed with family at bedside.  Pt denies headache, dizziness, chest pain, palpitations, SOB, abd pain, n/v/d, urinary symptoms, fevers, chills, weakness at this time. Pt awaiting CT and Xray . Safety maintained with call bell within reach.

## 2025-01-03 NOTE — H&P ADULT - ATTENDING COMMENTS
29 F with lupus (diagnosed age 19) and lupus nephritis, APLS on warfarin (INR goal 2-3 per patient), RA, HTN, HLD presented with first-time seizure.  At 1/3/25 04:00, sister heard a thud and went to find patient on the floor, arms contracted and shaking, eyes open, foaming at the mouth, duration 6 min. Pt was confused for 10 min afterward, then returned to baseline. No TB/UI.  Patient states she was standing up, straightening her hair, and the next thing she remembers is waking up with people around her looking concerned, and her father carried her to bed. She felt like she woke up from a nap. Did not have any pain when she woke up.    Pt has had insomnia since she was diagnosed with lupus - difficulty falling asleep and also wakes up after a short time asleep. She has been taking zolpidem 10 mg qhs but finds that the effect wears off after taking it for a long time, so she intermittently discontinues it and then restarts. She discontinued zolpidem for the past few days and has only been sleeping for about 2 hours/day during this time.    She had also taken additional doses of tramadol on 1/2 night before the seizure - had taken a total of 250 mg tramadol on 1/2 for knee pain (she was previously told to take max 150 mg in a day).    Has had stress in the past few weeks due to her father's medical condition. Father was scheduled to have surgery today 1/3. The stress exacerbated her knee pain which prompted her to take extra tramadol.    Pt used to take mycophenolate for lupus/lupus nephritis but self-discontinued it when her urine protein improved. She followed up with her doctors later who did not restart mycophenolate.    No hx of seizures or jerks.   Reports occasional hiccup-like spasm when she is talking (one witnessed by me during encounter).    Takes milk thistle as well.    Afebrile, SBP 90s-100s  Exam 1/3:  HEENT: no tongue laceration  Cognition: Awake, alert, oriented to situation, answers questions and follows commands briskly, attentive to conversation, provides appropriate history  Language: fluent, comprehension intact  EOMI, face symmetric, no dysarthria, tongue midline  No drift BUE  FNF and HTS intact b/l    CTH unremarkable  2017 MRI brain w/wo: L sublenticular finding likely prominent VR space, likely of no clinical significance    INR 2.31  PTT 43.9  Cr, LFT nl  No leukocytosis  PLT nl  hCG neg    Assessment:  First-time convulsive seizure, back to baseline  Possibly provoked by a combination of:  -Zolpidem withdrawal (though has short half-life and has not taken it for a few days. would have expected withdrawal symptoms earlier, within 2 days of discontinuation)   -Sleep deprivation   -Possible contribution of high dose of tramadol  R/o other causes in patient with lupus    Plan:  -no antiseizure medication indicated at this time  -MRI brain w/wo con  -vEEG  -continue home dose of zolpidem, avoid sudden discontinuation  -add melatonin 3 mg qhs, given 2 hr before bedtime  -can continue tramadol 50 mg q8h PRN (max 150 mg per day)  -patient does not take NSAIDs  -patient reported taking milk thistle - this is not FDA-approved for treatment of any condition. no clear benefit. no clear safety risk in this patient (caution in patients with diabetes)  -epilepsy clinic  -sleep clinic  -psychology referral for cognitive-behavioral therapy for insomnia  -follow up with her rheumatologist, nephrologist, urologist after discharge    Laureen Liz MD 29 F with lupus (diagnosed age 19) and lupus nephritis, APLS on warfarin (INR goal 2-3 per patient), RA, HTN, HLD presented with first-time seizure.  At 1/3/25 04:00, sister heard a thud and went to find patient on the floor, arms contracted and shaking, eyes open, foaming at the mouth, duration 6 min. Pt was confused for 10 min afterward, then returned to baseline. No TB/UI.  Patient states she was standing up, straightening her hair, and the next thing she remembers is waking up with people around her looking concerned, and her father carried her to bed. She felt like she woke up from a nap. Did not have any pain when she woke up.    Pt has had insomnia since she was diagnosed with lupus - difficulty falling asleep and also wakes up after a short time asleep. She has been taking zolpidem 10 mg qhs but finds that the effect wears off after taking it for a long time, so she intermittently discontinues it and then restarts. She discontinued zolpidem for the past few days and has only been sleeping for about 2 hours/day during this time.    She had also taken additional doses of tramadol on 1/2/25 night before the seizure - had taken a total of 250 mg tramadol on 1/2/25 for knee pain (she was previously told to take max 150 mg in a day).    Has had stress in the past few weeks due to her father's medical condition. Father was scheduled to have surgery today 1/3. The stress exacerbated her knee pain which prompted her to take extra tramadol.    Pt used to take mycophenolate for lupus/lupus nephritis but self-discontinued it when her urine protein improved. She followed up with her doctors later who did not restart mycophenolate.    No hx of seizures or jerks.   Reports occasional hiccup-like spasm when she is talking (one witnessed by me during encounter).    Takes milk thistle as well.    Afebrile, SBP 90s-100s  Exam 1/3:  HEENT: no tongue laceration  Cognition: Awake, alert, oriented to situation, answers questions and follows commands briskly, attentive to conversation, provides appropriate history  Language: fluent, comprehension intact  EOMI, face symmetric, no dysarthria, tongue midline  No drift BUE  FNF and HTS intact b/l    CTH unremarkable  2017 MRI brain w/wo: L sublenticular finding likely prominent VR space, likely of no clinical significance    INR 2.31  PTT 43.9  Cr, LFT nl  No leukocytosis  PLT nl  hCG neg    Assessment:  First-time convulsive seizure, back to baseline  Possibly provoked by a combination of:  -Zolpidem withdrawal (though has short half-life and has not taken it for a few days. would have expected withdrawal symptoms earlier, within 2 days of discontinuation)   -Sleep deprivation   -Possible contribution of high dose of tramadol  R/o other causes in patient with lupus    Plan:  -no antiseizure medication indicated at this time  -MRI brain w/wo con  -vEEG  -continue home dose of zolpidem, avoid sudden discontinuation  -add melatonin 3 mg qhs, given 2 hr before bedtime  -can continue tramadol 50 mg q8h PRN (max 150 mg per day)  -patient does not take NSAIDs  -patient reported taking milk thistle - this is not FDA-approved for treatment of any condition. no clear benefit. no clear safety risk in this patient (caution in patients with diabetes). limited data.  -epilepsy clinic  -sleep clinic  -psychology referral for cognitive-behavioral therapy for insomnia  -follow up with her rheumatologist, nephrologist, urologist after discharge  -Seizure precautions discussed with patient: No driving, operating heavy machinery, swimming unsupervised, working or playing at unprotected heights, standing near edge of subway platform, or biking in traffic    Laureen Liz MD

## 2025-01-03 NOTE — ED ADULT NURSE NOTE - OBJECTIVE STATEMENT
29y female w/ pmh of lupus presents for seizure activity. Pt accompanied by sister who states she heard patient fall in her room at 4:45 am and found her on the floor foaming from the mouth unresponsive for 5 minutes. Pt states she was straightening her hair and does not recall what happened after this; unknown headstrike. Pt states she takes coumadin for lupus and also states she took 100mg above her prescribed dose of tramadol due to pain from RA today. Pt denies pain, no visible signs of trauma.

## 2025-01-03 NOTE — ED PROVIDER NOTE - PROGRESS NOTE DETAILS
lum do pgy-2: received sign out on pt, reassessed pt, pt is stable, mentating at baseline, alert, awake, oriented X3. Pt's sister at bedside who witnessed seizure and explained events. Pt states she had no prodromal sxs prior to seizure. pending CTH results and will call neuro. dispo pending neuro. lum DO pgy-2: neuro consulted and will see in ED.

## 2025-01-03 NOTE — CONSULT NOTE ADULT - ASSESSMENT
28 yo female with PMH lupus, APLS, RA, HTN, HLD, presents with episode of seizure like activity witnessed by sister this morning. Sister reports she heard a thud and saw patient on the floor, arms contracted and shaking, eyes open, and foaming at the mouth. Episode lasted 6 minutes, and patient was confused for another 10 min before coming back to her normal self. Denies tongue bite, urinary incontinence. She reports she took an extra dose of her tramadol the previous night for her chronic knee pain. Patient denies history of seizure, family history of seizure, head trauma, meningitis, or developmental delays. Patient also endorse history of insomnia, and has been only sleeping around 2 hours a day.     Impression: First time episode of GTC, no clear risk factors, currently back to baseline with no neurological deficits.     Recommendations:  [] If CTH is negative, patient is okay to be discharge and can follow up with epilepsy neurology at [address] [place] [address] within 2 weeks after discharge. Please instruct the patient to call [phone] to schedule this appointment.   [] EEG outpatient  [] MRI Brain with epilepsy protocol  [] Check urinalysis, drug and toxicology screen to r/o underlying source of seizure trigger  [] Seizure, fall and aspiration precautions. Avoid sleep deprivation.   [] IV lorazepam 2mg IV prn for seizure activity lasting >3-5mins, >2x/hr, >3x/day, or if GTC , repeat after 1 minute (max dose 0.1 mg/kg)    [] If clinically possible, avoid Wellbutrin and Tramadol.  Carefully weigh risk/benefit ratio when considering the use of other drugs that are also known to lower seizure threshold such as SSRI's, Wellbutrin and Tramadol.       [] Given concern for seizure, advise the patient with regards to risks and driving privileges associated with the [place] Guidelines. Advise patient regarding the risk of seizures and general seizure safety recommendations including not to be bathing alone, climbing to high places and operating heavy machinery, until cleared by follow-up outpatient Neurology. Reinforce the importance of compliance with medications. Discuss sleep hygiene and the risks of sleep disruption. Discuss the risk of death associated with seizures / SUDEP.     [] Please note: if patient has a convulsion, please document length of episode, specifically what patient was doing paying attention to eye opening vs closure, gaze deviation, shaking of extremities, tongue bite, urinary incontinence, any derangement of vital signs. Generalized motor seizures can have dilated and unreactive pupils, absent oculocephalic reflexes (no dolls eyes), and open eyelids (99% of cases). Head turning from sided to side, pelvic thrusting, bicycling movements, hand waving are NOT manifestations of generalized motor seizures.    Discussed with epilepsy fellow Booker Keyes. Case and plan not final until Attending attestation. 30 yo female with PMH lupus, APLS, RA, HTN, HLD, presents with episode of seizure like activity witnessed by sister this morning. Sister reports she heard a thud and saw patient on the floor, arms contracted and shaking, eyes open, and foaming at the mouth. Episode lasted 6 minutes, and patient was confused for another 10 min before coming back to her normal self. Denies tongue bite, urinary incontinence. She reports she took an extra dose of her tramadol the previous night for her chronic knee pain. Patient denies history of seizure, family history of seizure, head trauma, meningitis, or developmental delays. Patient also endorse history of insomnia, and has been only sleeping around 2 hours a day.     Impression: First time episode of GTC, no clear risk factors, currently back to baseline with no neurological deficits.     Recommendations:  [] If CTH is negative, patient is okay to be discharge and can follow up with epilepsy neurology at [address] [place] [address] within 2 weeks after discharge. Please instruct the patient to call [phone] to schedule this appointment.   [] EEG outpatient  [] MRI Brain with epilepsy protocol  [] Check urinalysis, drug and toxicology screen to r/o underlying source of seizure trigger  [] Seizure, fall and aspiration precautions. Avoid sleep deprivation.   [] IV lorazepam 2mg IV prn for seizure activity lasting >3-5mins, >2x/hr, >3x/day, or if GTC , repeat after 1 minute (max dose 0.1 mg/kg)    [] If clinically possible, avoid Wellbutrin and Tramadol.  Carefully weigh risk/benefit ratio when considering the use of other drugs that are also known to lower seizure threshold such as SSRI's, Wellbutrin and Tramadol.       [] Given concern for seizure, advise the patient with regards to risks and driving privileges associated with the [place] Guidelines. Advise patient regarding the risk of seizures and general seizure safety recommendations including not to be bathing alone, climbing to high places and operating heavy machinery, until cleared by follow-up outpatient Neurology. Reinforce the importance of compliance with medications. Discuss sleep hygiene and the risks of sleep disruption. Discuss the risk of death associated with seizures / SUDEP.     Discussed with epilepsy fellow Booker Keyes. Case and plan not final until Attending attestation. 30 yo female with PMH lupus, APLS, RA, HTN, HLD, presents with episode of seizure like activity witnessed by sister this morning. Sister reports she heard a thud and saw patient on the floor, arms contracted and shaking, eyes open, and foaming at the mouth. Episode lasted 6 minutes, and patient was confused for another 10 min before coming back to her normal self. Denies tongue bite, urinary incontinence. She reports she took an extra dose of her tramadol the previous night for her chronic knee pain. Patient denies history of seizure, family history of seizure, head trauma, meningitis, or developmental delays. Patient also endorse history of insomnia, and has been only sleeping around 2 hours a day.     Impression: First time episode of GTC, no clear risk factors, currently back to baseline with no neurological deficits.     Recommendations:  [] EEG  [] MRI Brain with epilepsy protocol  [] Check urinalysis, drug and toxicology screen to r/o underlying source of seizure trigger  [] Seizure, fall and aspiration precautions. Avoid sleep deprivation.   [] IV lorazepam 2mg IV prn for seizure activity lasting >3-5mins, >2x/hr, >3x/day, or if GTC , repeat after 1 minute (max dose 0.1 mg/kg)    [] If clinically possible, avoid Wellbutrin and Tramadol.  Carefully weigh risk/benefit ratio when considering the use of other drugs that are also known to lower seizure threshold such as SSRI's, Wellbutrin and Tramadol.       [] Given concern for seizure, advise the patient with regards to risks and driving privileges associated with the [place] Guidelines. Advise patient regarding the risk of seizures and general seizure safety recommendations including not to be bathing alone, climbing to high places and operating heavy machinery, until cleared by follow-up outpatient Neurology. Reinforce the importance of compliance with medications. Discuss sleep hygiene and the risks of sleep disruption. Discuss the risk of death associated with seizures / SUDEP.     Discussed with epilepsy fellow Booker Keyes. Case and plan not final until Attending attestation.

## 2025-01-03 NOTE — ED ADULT NURSE NOTE - CHIEF COMPLAINT QUOTE
[facility] to [facility]; sister heard pt fall in bedroom at 4:45 am; unsure if hit head; sister observed seizure like activity - body stiffness and shaking last 6-7 minutes; no past hx of seizures; pmh  insomnia, lupus; took 5 doses of 50 mg tramadol over last 24 hours; last dose was 100mg at 0130; pt is now fully alert and oriented x 3 and ambulatory.

## 2025-01-03 NOTE — ED PROVIDER NOTE - ATTENDING CONTRIBUTION TO CARE
John Lowery MD, Emergency Medicine Attending Physician:     HPI: 29-year-old female with history of lupus on Plaquenil, antiphospholipid syndrome on Coumadin, RA on tramadol, hypertension on ramipril, hypothyroidism, who presents with seizure.  Patient's sister heard a thud in the bedroom around 4:45 AM, and found her sister with seizure-like activity in all 4 extremities and foaming at the mouth, seizure lasted for about 6-7 minutes, broke spontaneously, and patient was post ictal for 10 minutes afterwards.  Patient states that she has been taking more than her prescribed tramadol due to left lower extremity pain from the rheumatoid arthritis, took 250 mg over the last 24 hours.  Patient without any acute complaints.  Denies any tongue bite or incontinence.    ROS: denies fevers, chills, headache, dizziness, vision changes, neck pain or stiffness, numbness, weakness, vomiting, slurred speech, imbalance, chest pain, shortness of breath, palpitations.    Exam: Vital stable  GEN: In no acute distress, AAOx3  HENT: NCAT, no stridor  EYES: No icterus, PERRLA  RESP: No respiratory distress, CTAB  CV: No tachycardia, normal perfusion  ABD: Abdomen soft, non-tender and non-distended, no rebound, no guarding, no rigidity. No CVA tenderness.  Spine: No midline C/T/L-spine tenderness  NEURO: NEURO: AAOx3, Cranial nerves III-XII intact. Strength intact with 5/5 strength in all 4 extremities. Sensation intact to light touch in all 4 extremities. No pronator drift. Normal speech and gait.    MDM: Will obtain CT Head to evaluate for acute intracranial pathology.  Will obtain chest x-ray to evaluate for acute cardiopulmonary pathology.  Will obtain labs to evaluate for hematologic disorder, metabolic derangements, hepatic and renal function, and screen for infection.  Will consult with neurology.    Based on the NEXUS criteria, the patient does not warrant imaging for C-spine injury. The patient has no focal neurological deficit, midline spinal tenderness, altered level of consciousness, signs of intoxication, or distracting injury present. Intact nonfocal neuro exam with motor 5/5 in all 4 extremities, can range neck in all directions without pain.    Signed out at 7 AM pending labs and imaging, neuroconsultation and close reassessments for further treatment and disposition decisions.

## 2025-01-03 NOTE — ED ADULT TRIAGE NOTE - CHIEF COMPLAINT QUOTE
sister heard pt fall in bedroom at 4:45 am; unsure if hit head; sister observed seizure like activity - body stiffness and shaking last 6-7 minutes; no past hx of seizures; pmh  insomnia, lupus; took 5 doses of 50 mg tramadol over last 24 hours; last dose was 100mg at 0130; pt is now fully alert and oriented x 3 [facility] to [facility]; sister heard pt fall in bedroom at 4:45 am; unsure if hit head; sister observed seizure like activity - body stiffness and shaking last 6-7 minutes; no past hx of seizures; pmh  insomnia, lupus; took 5 doses of 50 mg tramadol over last 24 hours; last dose was 100mg at 0130; pt is now fully alert and oriented x 3 [facility] to [facility]; sister heard pt fall in bedroom at 4:45 am; unsure if hit head; sister observed seizure like activity - body stiffness and shaking last 6-7 minutes; no past hx of seizures; pmh  insomnia, lupus; took 5 doses of 50 mg tramadol over last 24 hours; last dose was 100mg at 0130; pt is now fully alert and oriented x 3 and ambulatory.

## 2025-01-03 NOTE — CONSULT NOTE ADULT - SUBJECTIVE AND OBJECTIVE BOX
Neurology - Consult Note    Spectra: [phone] ([facility]), [phone] ([facility])    HPI: 28 yo female with PMH lupus, APLS, RA, HTN, HLD, presents with episode of seizure like activity witnessed by sister this morning. Sister reports she heard a thud and saw patient on the floor, arms contracted and shaking, eyes open, and foaming at the mouth. Episode lasted 6 minutes, and patient was confused for another 10 min before coming back to her normal self. Denies tongue bite, urinary incontinence. She reports she took an extra dose of her tramadol the previous night for her chronic knee pain. Patient denies history of seizure, family history of seizure, head trauma, meningitis, or developmental delays. Patient also endorse history of insomnia, and has been only sleeping around 2 hours a day.       Review of Systems:   CONSTITUTIONAL: No fevers or chills  EYES AND ENT: No visual changes or no throat pain   NECK: No pain or stiffness  RESPIRATORY: No shortness of breath  CARDIOVASCULAR: No chest pain or palpitations  GASTROINTESTINAL: No nausea or vomiting   GENITOURINARY: No dysuria  NEUROLOGICAL: +As stated in HPI above  SKIN: No itching, burning, rashes, or lesions   All other review of systems is negative unless indicated above.    Allergies:  No Known Allergies      PMHx/PSHx/Family Hx: As above, otherwise see below   No pertinent past medical history    SLE (systemic lupus erythematosus)    Migraine        Social Hx:  Never smoker; no current use of tobacco, alcohol, or illicit drugs    Medications:  MEDICATIONS  (STANDING):    MEDICATIONS  (PRN):      Vitals:  T(C): 36.6 (01-03-25 @ 07:51), Max: 36.6 (01-03-25 @ 05:42)  HR: 92 (01-03-25 @ 07:51) (86 - 92)  BP: 98/66 (01-03-25 @ 07:51) (98/66 - 105/73)  RR: 18 (01-03-25 @ 07:51) (18 - 18)  SpO2: 95% (01-03-25 @ 07:51) (94% - 95%)    Physical Examination:  General - non-toxic appearing female in no acute distress  Cardiovascular - peripheral pulses palpable, no edema  Respiratory - breathing comfortably with no increased work of breathing    Neurologic Exam:  Mental status - Awake, Alert, Oriented to person, place, and time. Speech fluent, repetition and naming intact. Follows simple and complex commands. Attention/concentration, recent and remote memory (including registration 3/3 and recall 3/3), and fund of knowledge intact    Cranial nerves - PERRLA (4mm -> 3mm b/l), VFF, EOMI, face sensation (V1-V3) intact b/l, facial strength intact without asymmetry b/l, hearing intact b/l, palate with symmetric elevation, trapezius OR sternocleidomastiod 5/5 strength b/l, tongue midline on protrusion with full lateral movement    Motor - Normal bulk and tone throughout. No pronator drift.    Strength testing                              Right           Left  Should-Abduct       5                   5  Elbow-Flex             5                   5  Elbow-Ext              5                   5  Wrist-Flex              5                   5  Wrist-Ext               5                   5                        5                   5    Hip-Flex                 5                   5  Hip-Ext                  5                   5  Knee-Flex              5                   5  Knee-Ext                5                   5  Dorsiflex                5                   5  Plantarflex             5                   5    Sensation - Light touch intact throughout    Reflexes:                                             Right             Left  Triceps                     2+                2+  Biceps                      2+                2+  Brachioradialis          2+                2+  Patellar                    2+                 2+  Achilles                    2+                 2+  Plantar                   Down            Down    Coordination - Finger to Nose intact b/l. HKS intact bilaterally. No tremors appreciated    Gait and station - Normal casual gait. Romberg (-)    Labs:                        12.5   5.21  )-----------( 275      ( 03 Jan 2025 06:39 )             39.3     01-03    140  |  102  |  20  ----------------------------<  104[H]  3.8   |  25  |  0.56    Ca    9.1      03 Jan 2025 06:39  Phos  2.6     01-03  Mg     2.1     01-03    TPro  6.8  /  Alb  4.4  /  TBili  <0.1[L]  /  DBili  x   /  AST  16  /  ALT  17  /  AlkPhos  67  01-03    CAPILLARY BLOOD GLUCOSE        LIVER FUNCTIONS - ( 03 Jan 2025 06:39 )  Alb: 4.4 g/dL / Pro: 6.8 g/dL / ALK PHOS: 67 U/L / ALT: 17 U/L / AST: 16 U/L / GGT: x             PT/INR - ( 03 Jan 2025 06:39 )   PT: 26.1 sec;   INR: 2.31 ratio         PTT - ( 03 Jan 2025 06:39 )  PTT:43.9 sec  CSF:                  Radiology:

## 2025-01-03 NOTE — H&P ADULT - HISTORY OF PRESENT ILLNESS
30 yo female with PMH lupus, APLS, RA, HTN, HLD, presents with episode of seizure like activity witnessed by sister this morning. Sister reports she heard a thud and saw patient on the floor, arms contracted and shaking, eyes open, and foaming at the mouth. Episode lasted 6 minutes, and patient was confused for another 10 min before coming back to her normal self. Denies tongue bite, urinary incontinence. She reports she took an extra dose of her tramadol the previous night for her chronic knee pain. Patient denies history of seizure, family history of seizure, head trauma, meningitis, or developmental delays. Patient also endorse history of insomnia, and has been only sleeping around 2 hours a day.

## 2025-01-03 NOTE — H&P ADULT - NSHPREVIEWOFSYSTEMS_GEN_ALL_CORE
CONSTITUTIONAL: No fevers or chills  EYES AND ENT: No visual changes or no throat pain   NECK: No pain or stiffness  RESPIRATORY: No shortness of breath  CARDIOVASCULAR: No chest pain or palpitations  GASTROINTESTINAL: No nausea or vomiting   GENITOURINARY: No dysuria  NEUROLOGICAL: +As stated in HPI above  SKIN: No itching, burning, rashes, or lesions   All other review of systems is negative unless indicated above.

## 2025-01-03 NOTE — ED ADULT NURSE NOTE - NSFALLHARMRISKINTERV_ED_ALL_ED

## 2025-01-03 NOTE — H&P ADULT - NSHPPHYSICALEXAM_GEN_ALL_CORE
Physical Examination:  General - non-toxic appearing female in no acute distress    Neurologic Exam:  Mental status - Awake, Alert, Oriented to person, place, and time. Speech fluent, repetition and naming intact. Follows simple and complex commands. Attention/concentration, recent and remote memory (including registration 3/3 and recall 3/3), and fund of knowledge intact    Cranial nerves - PERRLA (4mm -> 3mm b/l), VFF, EOMI, face sensation (V1-V3) intact b/l, facial strength intact without asymmetry b/l, hearing intact b/l, palate with symmetric elevation, trapezius OR sternocleidomastiod 5/5 strength b/l, tongue midline on protrusion with full lateral movement    Motor - Normal bulk and tone throughout. No pronator drift.    Strength testing                              Right           Left  Should-Abduct       5                   5  Elbow-Flex             5                   5  Elbow-Ext              5                   5  Wrist-Flex              5                   5  Wrist-Ext               5                   5                        5                   5    Hip-Flex                 5                   5  Hip-Ext                  5                   5  Knee-Flex              5                   5  Knee-Ext                5                   5  Dorsiflex                5                   5  Plantarflex             5                   5    Sensation - Light touch intact throughout    Reflexes:                                             Right             Left  Triceps                     2+                2+  Biceps                      2+                2+  Brachioradialis          2+                2+  Patellar                    2+                 2+  Achilles                    2+                 2+  Plantar                   Down            Down    Coordination - Finger to Nose intact b/l. HKS intact bilaterally. No tremors appreciated    Gait and station - Normal casual gait. Romberg (-)

## 2025-01-03 NOTE — ED ADULT NURSE NOTE - NS ED NOTE ABUSE SUSPICION NEGLECT YN
upper arm digital cuff.    A great deal of time spent reviewing medications, diet, exercise, social issues. Also reviewing the chart before entering the room with patient and finishing charting after leaving patient's room. More than half of that time was spent face to face with the patient in counseling and coordinating care.  I informed patient about the risk associated with noncompliance of medication and taking medications incorrectly.  Appropriate follow-up with myself and all specialist.  Encourage family members to take active role in assisting with medications and medical care.  If any confusion should develop to notify my office immediately to avoid risk of worsening medical condition    Aggressive low-fat diet.  Avoid red meats, greasy fried foods, dairy products.  Avoid processed foods.  Take cholesterol medications without food.      Check blood sugars 4 times a day.  Aggressive low, sugar low carbohydrate diet.  Call if blood sugar less than 70 or over 200.  Avoid eating in between meals.  Lose weight.  Exercise.      Follow Up: No follow-ups on file.     Seen by:  Adi Sesay DO     
No

## 2025-01-04 LAB
ANION GAP SERPL CALC-SCNC: 11 MMOL/L — SIGNIFICANT CHANGE UP (ref 5–17)
BUN SERPL-MCNC: 12 MG/DL — SIGNIFICANT CHANGE UP (ref 7–23)
CALCIUM SERPL-MCNC: 9 MG/DL — SIGNIFICANT CHANGE UP (ref 8.4–10.5)
CHLORIDE SERPL-SCNC: 103 MMOL/L — SIGNIFICANT CHANGE UP (ref 96–108)
CK SERPL-CCNC: 59 U/L — SIGNIFICANT CHANGE UP (ref 25–170)
CO2 SERPL-SCNC: 24 MMOL/L — SIGNIFICANT CHANGE UP (ref 22–31)
CREAT SERPL-MCNC: 0.42 MG/DL — LOW (ref 0.5–1.3)
EGFR: 135 ML/MIN/1.73M2 — SIGNIFICANT CHANGE UP
GLUCOSE SERPL-MCNC: 92 MG/DL — SIGNIFICANT CHANGE UP (ref 70–99)
HCT VFR BLD CALC: 40.4 % — SIGNIFICANT CHANGE UP (ref 34.5–45)
HGB BLD-MCNC: 12.6 G/DL — SIGNIFICANT CHANGE UP (ref 11.5–15.5)
INR BLD: 2.89 RATIO — HIGH (ref 0.85–1.16)
MAGNESIUM SERPL-MCNC: 2.1 MG/DL — SIGNIFICANT CHANGE UP (ref 1.6–2.6)
MCHC RBC-ENTMCNC: 29.1 PG — SIGNIFICANT CHANGE UP (ref 27–34)
MCHC RBC-ENTMCNC: 31.2 G/DL — LOW (ref 32–36)
MCV RBC AUTO: 93.3 FL — SIGNIFICANT CHANGE UP (ref 80–100)
NRBC # BLD: 0 /100 WBCS — SIGNIFICANT CHANGE UP (ref 0–0)
PHOSPHATE SERPL-MCNC: 3.2 MG/DL — SIGNIFICANT CHANGE UP (ref 2.5–4.5)
PLATELET # BLD AUTO: 228 K/UL — SIGNIFICANT CHANGE UP (ref 150–400)
POTASSIUM SERPL-MCNC: 4.2 MMOL/L — SIGNIFICANT CHANGE UP (ref 3.5–5.3)
POTASSIUM SERPL-SCNC: 4.2 MMOL/L — SIGNIFICANT CHANGE UP (ref 3.5–5.3)
PROTHROM AB SERPL-ACNC: 32.9 SEC — HIGH (ref 9.9–13.4)
RBC # BLD: 4.33 M/UL — SIGNIFICANT CHANGE UP (ref 3.8–5.2)
RBC # FLD: 14 % — SIGNIFICANT CHANGE UP (ref 10.3–14.5)
SODIUM SERPL-SCNC: 138 MMOL/L — SIGNIFICANT CHANGE UP (ref 135–145)
WBC # BLD: 4.37 K/UL — SIGNIFICANT CHANGE UP (ref 3.8–10.5)
WBC # FLD AUTO: 4.37 K/UL — SIGNIFICANT CHANGE UP (ref 3.8–10.5)

## 2025-01-04 PROCEDURE — 95720 EEG PHY/QHP EA INCR W/VEEG: CPT

## 2025-01-04 RX ORDER — WARFARIN SODIUM 10 MG/1
5 TABLET ORAL AT BEDTIME
Refills: 0 | Status: COMPLETED | OUTPATIENT
Start: 2025-01-04 | End: 2025-01-04

## 2025-01-04 RX ADMIN — WARFARIN SODIUM 5 MILLIGRAM(S): 10 TABLET ORAL at 22:28

## 2025-01-04 RX ADMIN — PREGABALIN 200 MILLIGRAM(S): 100 CAPSULE ORAL at 13:14

## 2025-01-04 RX ADMIN — TRAMADOL HYDROCHLORIDE 50 MILLIGRAM(S): 50 TABLET ORAL at 23:15

## 2025-01-04 RX ADMIN — ATORVASTATIN CALCIUM 20 MILLIGRAM(S): 40 TABLET, FILM COATED ORAL at 22:27

## 2025-01-04 RX ADMIN — TRAMADOL HYDROCHLORIDE 50 MILLIGRAM(S): 50 TABLET ORAL at 13:17

## 2025-01-04 RX ADMIN — Medication 3 MILLIGRAM(S): at 22:27

## 2025-01-04 RX ADMIN — PREGABALIN 200 MILLIGRAM(S): 100 CAPSULE ORAL at 22:27

## 2025-01-04 RX ADMIN — PREGABALIN 200 MILLIGRAM(S): 100 CAPSULE ORAL at 05:21

## 2025-01-04 RX ADMIN — LEVOTHYROXINE SODIUM 50 MICROGRAM(S): 175 TABLET ORAL at 05:21

## 2025-01-04 RX ADMIN — TRAMADOL HYDROCHLORIDE 50 MILLIGRAM(S): 50 TABLET ORAL at 14:00

## 2025-01-04 RX ADMIN — Medication 1 MILLIGRAM(S): at 13:14

## 2025-01-04 RX ADMIN — Medication 1000 UNIT(S): at 13:14

## 2025-01-04 RX ADMIN — HYDROXYCHLOROQUINE SULFATE 200 MILLIGRAM(S): 200 TABLET ORAL at 13:13

## 2025-01-04 RX ADMIN — TRAMADOL HYDROCHLORIDE 50 MILLIGRAM(S): 50 TABLET ORAL at 22:36

## 2025-01-04 RX ADMIN — Medication 10 MILLIGRAM(S): at 22:52

## 2025-01-04 NOTE — EEG REPORT - NS EEG TEXT BOX
-- DAY 1 	  -- START: 1/3/2025  1638     -- END: 	1/4/2025  0800  -- DURATION (HRS):  15hr 22min  -----------------------------------------------------------------------------------------------------------    DAILY EEG VISUAL ANALYSIS    The background was continuous, symmetric, spontaneously variable and reactive. During wakefulness, the posterior dominant rhythm consisted of symmetric, well-modulated 10-11 Hz activity, with amplitude to 40 uV, that attenuated to eye opening.  Low amplitude frontal beta was noted in wakefulness.   The anterior to posterior gradient was present.     BACKGROUND SLOWING:  No generalized background slowing was present.    FOCAL SLOWING:   None was present.    SLEEP BACKGROUND:  Drowsiness was characterized by fragmentation, attenuation, and slowing of the background activity.    Sleep was characterized by the presence of vertex waves, symmetric sleep spindles and K-complexes.    OTHER NON-EPILEPTIFORM FINDINGS:  1-3 second long bifrontal discharges during sleep, of uncertain significance, runs of vertex waves v. epileptiform findings         ACTIVATION PROCEDURES:   Photic stimulation was not performed.  Hyperventilation was performed and did not elicit any abnormalities.    There was mild accentuation of fast activity, and an increase in diffuse polymorphic slowing.      INTERICTAL EPILEPTIFORM ACTIVITY:   None were present.    EVENTS:  No events or seizures recorded.    ARTIFACTS:  Intermittent myogenic and movement artifacts were noted.    ECG:  The heart rate on single channel ECG was predominantly between 70-90  BPM.    ASMs:   None    -----------------------------------------------------------------------------------------------------------  EEG CLASSIFICATION:  Normal EEG in the awake, drowsy and asleep states.      -----------------------------------------------------------------------------------------------------------  IMPRESSION/CLINICAL CORRELATE:  This is a normal VEEG. No epileptiform pattern or seizures were recorded  1-3 second long bifrontal discharges during sleep, of uncertain significance, runs of vertex waves favored over epileptiform findings    -----------------------------------------------------------------------------------------------------------    Booker Keyes DO  Fellow, [facility]        Trevor Garcia MD, PhD  Director, Epilepsy Division, [facility]   -- DAY 1 	  -- START: 1/3/2025  1638     -- END: 	1/4/2025  0800  -- DURATION (HRS):  15hr 22min  -----------------------------------------------------------------------------------------------------------    DAILY EEG VISUAL ANALYSIS    The background was continuous, symmetric, spontaneously variable and reactive. During wakefulness, the posterior dominant rhythm consisted of symmetric, well-modulated 10-11 Hz activity, with amplitude to 40 uV, that attenuated to eye opening.  Low amplitude frontal beta was noted in wakefulness.   The anterior to posterior gradient was present.     BACKGROUND SLOWING:  No generalized background slowing was present.    FOCAL SLOWING:   None was present.    SLEEP BACKGROUND:  Drowsiness was characterized by fragmentation, attenuation, and slowing of the background activity.    Sleep was characterized by the presence of vertex waves, symmetric sleep spindles and K-complexes.    OTHER NON-EPILEPTIFORM FINDINGS:  1-3 second long bifrontal discharges during sleep, of uncertain significance, runs of vertex waves v. epileptiform findings     ACTIVATION PROCEDURES:   Photic stimulation was not performed.  Hyperventilation was performed and did not elicit any abnormalities.    There was mild accentuation of fast activity, and an increase in diffuse polymorphic slowing.      INTERICTAL EPILEPTIFORM ACTIVITY:   None were present.    EVENTS:  No events or seizures recorded.    ARTIFACTS:  Intermittent myogenic and movement artifacts were noted.    ECG:  The heart rate on single channel ECG was predominantly between 70-90  BPM.    ASMs:   None    -----------------------------------------------------------------------------------------------------------  EEG CLASSIFICATION:  Normal EEG in the awake, drowsy and asleep states.    -----------------------------------------------------------------------------------------------------------  IMPRESSION/CLINICAL CORRELATE:  This is a normal VEEG. No epileptiform pattern or seizures were recorded  1-3 second long bifrontal discharges during sleep, of uncertain significance, runs of vertex waves favored over epileptiform findings    -----------------------------------------------------------------------------------------------------------    Booker Keyes DO  Fellow, [facility]    Trevor Garcia MD, PhD  Director, Epilepsy Division, [facility]

## 2025-01-04 NOTE — PROGRESS NOTE ADULT - SUBJECTIVE AND OBJECTIVE BOX
Neurology Progress Note    SUBJECTIVE/OBJECTIVE/INTERVAL EVENTS: Patient seen and examined at bedside w/ neuro attending and team.     INTERVAL HISTORY:    REVIEW OF SYSTEMS: Few questions of a 10-system ROS was performed and is negative except for those items noted above and/or in the HPI.    VITALS & EXAMINATION:  Vital Signs Last 24 Hrs  T(C): 36.6 (04 Jan 2025 04:50), Max: 37.2 (03 Jan 2025 15:23)  T(F): 97.9 (04 Jan 2025 04:50), Max: 98.9 (03 Jan 2025 15:23)  HR: 65 (04 Jan 2025 04:50) (65 - 84)  BP: 91/51 (04 Jan 2025 04:50) (91/51 - 139/95)  BP(mean): 78 (03 Jan 2025 12:40) (78 - 78)  RR: 18 (04 Jan 2025 04:50) (18 - 19)  SpO2: 96% (04 Jan 2025 04:50) (96% - 98%)    Parameters below as of 04 Jan 2025 04:50  Patient On (Oxygen Delivery Method): room air        General:  Constitutional: Female, appears stated age, nontoxic, not in distress,    Head: Normocephalic;   Eyes: clear sclera;   Extremities: No cyanosis;   Resp: breathing comfortably  Neck: no nuchal rigidity  Fundoscopic exam:      Neurological (>12):  MS: Awake, alert.  Oriented person place situation year month. Follows simple complex cross commands. Able to ID 3/3 objects. Attends to examiner  Language: Speech is hypophonic, clear, fluent, good repetition,  comprehension, registration of words.  CNs: PERRL (R 3mm, L 3mm). VFF. EOMI. No disconjugate gaze, nystagmus. V1-3 intact LT, No facial asymmetry b/l. Hearing grossly normal b/l. Tongue midline and can move side to side.     Motor - Normal bulk and tone throughout. No pronator drift.    L/R (out of 5 each)       Deltoid  5/5    Biceps   5/5      Triceps  5/5         Wrist Extension 5/5   Wrist Flexion  5/5   Interossei 5/5     5/5   L/R (out of 5 each)       Hip Flexion  5/5    Hip Extension  5/5  Knee Extension  5/5  Dorsiflexion  5/5      Plantar Flexion 5/5     Sensation: Intact to LT b/l x4 extremities. Cortical: Extinction on DSS (neglect): none  Reflexes L/R:  Biceps(C5) 2/2  BR(C6) 2/2   Triceps(C7)  2/2 Patellar(L4)   2/2   Ankles 2/2   Toes: mute b/l  no ankle clonus  Coordination: No dysmetria to FTN b/l UE  Gait: Normal Romberg. No postural instability. Normal stance. Gait baseline.    LABORATORY:  CBC                       12.6   4.37  )-----------( 228      ( 04 Jan 2025 06:12 )             40.4     Chem 01-04    138  |  103  |  12  ----------------------------<  92  4.2   |  24  |  0.42[L]    Ca    9.0      04 Jan 2025 06:12  Phos  3.2     01-04  Mg     2.1     01-04    TPro  6.8  /  Alb  4.4  /  TBili  <0.1[L]  /  DBili  x   /  AST  16  /  ALT  17  /  AlkPhos  67  01-03    LFTs LIVER FUNCTIONS - ( 03 Jan 2025 06:39 )  Alb: 4.4 g/dL / Pro: 6.8 g/dL / ALK PHOS: 67 U/L / ALT: 17 U/L / AST: 16 U/L / GGT: x           Coagulopathy PT/INR - ( 04 Jan 2025 06:12 )   PT: 32.9 sec;   INR: 2.89 ratio         PTT - ( 03 Jan 2025 06:39 )  PTT:43.9 sec  Lipid Panel   A1c   Cardiac enzymes     U/A Urinalysis Basic - ( 04 Jan 2025 06:12 )    Color: x / Appearance: x / SG: x / pH: x  Gluc: 92 mg/dL / Ketone: x  / Bili: x / Urobili: x   Blood: x / Protein: x / Nitrite: x   Leuk Esterase: x / RBC: x / WBC x   Sq Epi: x / Non Sq Epi: x / Bacteria: x      CSF  Immunological  Other    STUDIES & IMAGING: (EEG, CT, MR, U/S, TTE/LEON): Neurology Progress Note    SUBJECTIVE/OBJECTIVE/INTERVAL EVENTS: Patient seen and examined at bedside w/ neuro attending and team.     INTERVAL HISTORY:  NAEON.    This AM, says, "I'm OK." Family at bedside. Extensive discussion with patient regarding current EEG findings and need for further EEG monitoring. Patient expresses understanding and is agreeable to stay. Patient has limited recollection of yesterday's events. Says she had thought she had awoken from a nap. Says she was v. v. stressed prior to the event. Was straightening her hair prior to event. Entire event up until she made her way to bed was 6-7 minutes.     Denies history of developmental delay. Denies FH of seizures. Seizure precautions discussed.    REVIEW OF SYSTEMS: Few questions of a 10-system ROS was performed and is negative except for those items noted above and/or in the HPI.    VITALS & EXAMINATION:  Vital Signs Last 24 Hrs  T(C): 36.6 (04 Jan 2025 04:50), Max: 37.2 (03 Jan 2025 15:23)  T(F): 97.9 (04 Jan 2025 04:50), Max: 98.9 (03 Jan 2025 15:23)  HR: 65 (04 Jan 2025 04:50) (65 - 84)  BP: 91/51 (04 Jan 2025 04:50) (91/51 - 139/95)  BP(mean): 78 (03 Jan 2025 12:40) (78 - 78)  RR: 18 (04 Jan 2025 04:50) (18 - 19)  SpO2: 96% (04 Jan 2025 04:50) (96% - 98%)    Parameters below as of 04 Jan 2025 04:50  Patient On (Oxygen Delivery Method): room air    Physical Examination:  General - non-toxic appearing female in no acute distress, EEG leads in place    Neurologic Exam:  Mental status - Awake, Alert, Oriented to person, place, and time. Speech fluent, repetition and naming intact. Follows simple and complex commands. Attention/concentration, recent and remote memory (including registration 3/3 and recall 3/3), and fund of knowledge intact    Cranial nerves - PERRLA (4mm -> 3mm b/l), VFF, EOMI, face sensation (V1-V3) intact b/l, facial strength intact without asymmetry b/l, hearing intact b/l, palate with symmetric elevation, trapezius/sternocleidomastiod 5/5 strength b/l, tongue midline on protrusion with full lateral movement    Motor - Normal bulk and tone throughout. No pronator drift.    Strength testing                              Right           Left  Should-Abduct       5                   5  Elbow-Flex             5                   5  Elbow-Ext              5                   5  Wrist-Flex              5                   5  Wrist-Ext               5                   5                        5                   5    Hip-Flex                 5                   5  Hip-Ext                  5                   5  Knee-Flex              5                   5  Knee-Ext                5                   5  Dorsiflex                5                   5  Plantarflex             5                   5    Sensation - Light touch intact throughout    Reflexes:                                             Right             Left  Triceps                     2+                2+  Biceps                      2+                2+  Brachioradialis          2+                2+  Patellar                    2+                 2+  Achilles                    2+                 2+  Plantar                   Down            Down    Coordination - Finger to Nose intact b/l. HKS intact bilaterally. No tremors appreciated.    Gait and station - Deferred today    LABORATORY:  CBC                       12.6   4.37  )-----------( 228      ( 04 Jan 2025 06:12 )             40.4     Chem 01-04    138  |  103  |  12  ----------------------------<  92  4.2   |  24  |  0.42[L]    Ca    9.0      04 Jan 2025 06:12  Phos  3.2     01-04  Mg     2.1     01-04    TPro  6.8  /  Alb  4.4  /  TBili  <0.1[L]  /  DBili  x   /  AST  16  /  ALT  17  /  AlkPhos  67  01-03    LFTs LIVER FUNCTIONS - ( 03 Jan 2025 06:39 )  Alb: 4.4 g/dL / Pro: 6.8 g/dL / ALK PHOS: 67 U/L / ALT: 17 U/L / AST: 16 U/L / GGT: x           Coagulopathy PT/INR - ( 04 Jan 2025 06:12 )   PT: 32.9 sec;   INR: 2.89 ratio         PTT - ( 03 Jan 2025 06:39 )  PTT:43.9 sec  Lipid Panel   A1c   Cardiac enzymes     U/A Urinalysis Basic - ( 04 Jan 2025 06:12 )    Color: x / Appearance: x / SG: x / pH: x  Gluc: 92 mg/dL / Ketone: x  / Bili: x / Urobili: x   Blood: x / Protein: x / Nitrite: x   Leuk Esterase: x / RBC: x / WBC x   Sq Epi: x / Non Sq Epi: x / Bacteria: x      CSF  Immunological  Other    STUDIES & IMAGING: (EEG, CT, MR, U/S, TTE/LEON):  EEG CLASSIFICATION (1/4/2024):  Normal EEG in the awake, drowsy and asleep states.  -----------------------------------------------------------------------------------------------------------  IMPRESSION/CLINICAL CORRELATE:  This is a normal VEEG. No epileptiform pattern or seizures were recorded  1-3 second long bifrontal discharges during sleep, of uncertain significance, runs of vertex waves favored over epileptiform findings

## 2025-01-04 NOTE — PROGRESS NOTE ADULT - ATTENDING COMMENTS
Interval history, physical and testing results reviewed and discussed with epilepsy fellow, Dr. Keyes.  I agree with plan as documented.

## 2025-01-04 NOTE — EEG REPORT - THIS IS A
[facility]    [id number] [place] [address]    Phone:  [phone]       Thank You for choosing us for your health care visit. We are glad to serve you and happy to provide you with this summary of your visit. Please help us to ensure we have accurate records. If you find anything that needs to be changed, please let our staff know as soon as possible.          Your Demographic Information     Patient Name Sex Jah Dominguez Male 2000       Ethnic Group Patient Race    Not of  or  Origin White      Your Visit Details     Date & Time Provider Department    3/21/2017 1:40 PM Gerard Galicia, [facility]      Your Vitals Were     Smoking Status                   Never Smoker           Medications Prescribed or Re-Ordered Today     None      Your Current Medications Are        Disp Refills Start End    clindamycin (CLEOCIN) 300 MG capsule 30 capsule 0 2016     Sig - Route: Take 1 capsule by mouth 3 times daily. - Oral    Class: Eprescribe    Multiple Vitamins-Minerals (MULTIVITAMIN PO)        Sig - Route: Take  by mouth. - Oral    Class: Historical Med    ZYRTEC 10 MG PO TABS 0 0 2008     Sig - Route: 1 tab po daily in pm - Oral    Class: Historical Med      Allergies     Penicillins     Augmentin-hives, unsure if true allergy      Immunizations History as of 3/21/2017     Name Date    DTaP 12/3/2004, 2001, 2000, 3/31/2000, 2000    HEPATITIS A CHILD 2015, 10/3/2014    HPV 9-VALENT 2015    HPV QUAD 10/3/2014    Hib/hepatitis B 3/8/2001, 3/31/2000, 2000    Influenza 2012, 2011, 2010, 10/2/2009, 10/31/2008, 2007  6:43 PM, 2006, 2006    Influenza Quadrivalent Live 2015, 10/3/2014, 2013    MMR 12/3/2004, 3/8/2001    Meningococcal Conjugate MCV4P (Menactra) 2015    Meningococcus 2011    Pneumococcal Conjugate  11/24/2000, 8/11/2000, 5/30/2000    Polio, INACTIVATED 12/3/2004, 6/26/2001, 3/31/2000, 1/25/2000    Tdap 8/30/2011    Varicella 8/29/2008, 11/24/2000      Problem List as of 3/21/2017     Acute suppurative otitis media without spontaneous rupture of eardrum    Otorrhea, unspecified    Allergic rhinitis, cause unspecified    Other atopic dermatitis and related conditions    Paronychia of great toe, right            Patient Instructions     None       Continuous Video EEG

## 2025-01-04 NOTE — PROGRESS NOTE ADULT - ASSESSMENT
30 yo female with PMH lupus, APLS, RA, HTN, HLD, presents 1/3/2024, with episode of seizure like activity witnessed by sister this morning. Sister reports she heard a thud and saw patient on the floor, arms contracted and shaking, eyes open, and foaming at the mouth. Episode lasted 6 minutes, and patient was confused for another 10 min before coming back to her normal self. Denies tongue bite, urinary incontinence. She reports she took an extra dose of her tramadol the previous night for her chronic knee pain. Patient denies history of seizure, family history of seizure, head trauma, meningitis, or developmental delays. Patient also endorses history of insomnia, and has been only sleeping around 2 hours a day.     Impression: First time episode of GTC, no clear risk factors, currently back to baseline with no neurological deficits. Etiology could be combination of sleep deprivation +/- tramadol toxicity +/- ambien withdrawal    Plan:  [] No antiseizure medication indicated at this time  [] MRI brain w/wo contrast (epilepsy protocol), MRA head/neck  [] vEEG  [] continue home dose of zolpidem, avoid sudden discontinuation  [] add melatonin 3 mg qhs, given 2 hr before bedtime  [] can continue tramadol 50 mg q8h PRN (max 150 mg per day)  [] patient does not take NSAIDs  [] patient reported taking milk thistle - this is not FDA-approved for treatment of any condition. no clear benefit. no clear safety risk in this patient (caution in patients with diabetes). limited data.  [] epilepsy clinic  [] sleep clinic  [] psychology referral for cognitive-behavioral therapy for insomnia  [] follow up with her rheumatologist, nephrologist, urologist after discharge  [] Seizure precautions discussed with patient: No driving, operating heavy machinery, swimming unsupervised, working or playing at unprotected heights, standing near edge of subway platform, or biking in traffic    Seen and d/w Epilepsy attending. Await attending attestation to finalize plan. 30 yo female with PMH lupus, APLS, RA, HTN, HLD, presents 1/3/2024, with episode of seizure like activity witnessed by sister this morning. Sister reports she heard a thud and saw patient on the floor, arms contracted and shaking, eyes open, and foaming at the mouth. Episode lasted 6 minutes, and patient was confused for another 10 min before coming back to her normal self. Denies tongue bite, urinary incontinence. She reports she took an extra dose of her tramadol the previous night for her chronic knee pain. Patient denies history of seizure, family history of seizure, head trauma, meningitis, or developmental delays. Patient also endorses history of insomnia, and has been only sleeping around 2 hours a day.     Impression: First time episode of GTC, no clear risk factors, currently back to baseline with no neurological deficits. EEG with 1-3 second long bifrontal discharges during sleep - likely runs of vertex waves vs. less likely epileptiform findings    Plan:  [] No antiseizure medication indicated at this time  [] MRI brain w/wo contrast (epilepsy protocol), MRA head/neck  [] vEEG - currently equivocal - continue for another 24 hours  [] continue home dose of zolpidem, avoid sudden discontinuation  [] add melatonin 3 mg qhs, given 2 hr before bedtime  [] can continue tramadol 50 mg q8h PRN (max 150 mg per day)  [] Continue home warfarin for APS (goal 2.0-3.0) - dose daily based on AM INR  [] patient does not take NSAIDs  [] patient reported taking milk thistle - this is not FDA-approved for treatment of any condition. no clear benefit. no clear safety risk in this patient (caution in patients with diabetes). limited data.  [] epilepsy clinic  [] sleep clinic  [] psychology referral for cognitive-behavioral therapy for insomnia  [] follow up with her rheumatologist, nephrologist, urologist after discharge  [] Seizure precautions discussed with patient: No driving, operating heavy machinery, swimming unsupervised, working or playing at unprotected heights, standing near edge of subway platform, or biking in traffic    Seen and d/w Epilepsy attending. Await attending attestation to finalize plan.

## 2025-01-05 VITALS
OXYGEN SATURATION: 95 % | SYSTOLIC BLOOD PRESSURE: 107 MMHG | TEMPERATURE: 98 F | HEART RATE: 81 BPM | DIASTOLIC BLOOD PRESSURE: 68 MMHG | RESPIRATION RATE: 18 BRPM

## 2025-01-05 LAB
ANION GAP SERPL CALC-SCNC: 13 MMOL/L — SIGNIFICANT CHANGE UP (ref 5–17)
BUN SERPL-MCNC: 23 MG/DL — SIGNIFICANT CHANGE UP (ref 7–23)
CALCIUM SERPL-MCNC: 9.2 MG/DL — SIGNIFICANT CHANGE UP (ref 8.4–10.5)
CHLORIDE SERPL-SCNC: 105 MMOL/L — SIGNIFICANT CHANGE UP (ref 96–108)
CO2 SERPL-SCNC: 22 MMOL/L — SIGNIFICANT CHANGE UP (ref 22–31)
CREAT SERPL-MCNC: 0.54 MG/DL — SIGNIFICANT CHANGE UP (ref 0.5–1.3)
EGFR: 127 ML/MIN/1.73M2 — SIGNIFICANT CHANGE UP
GLUCOSE SERPL-MCNC: 97 MG/DL — SIGNIFICANT CHANGE UP (ref 70–99)
HCT VFR BLD CALC: 39.6 % — SIGNIFICANT CHANGE UP (ref 34.5–45)
HGB BLD-MCNC: 12.5 G/DL — SIGNIFICANT CHANGE UP (ref 11.5–15.5)
INR BLD: 2.41 RATIO — HIGH (ref 0.85–1.16)
MCHC RBC-ENTMCNC: 29.4 PG — SIGNIFICANT CHANGE UP (ref 27–34)
MCHC RBC-ENTMCNC: 31.6 G/DL — LOW (ref 32–36)
MCV RBC AUTO: 93.2 FL — SIGNIFICANT CHANGE UP (ref 80–100)
NRBC # BLD: 0 /100 WBCS — SIGNIFICANT CHANGE UP (ref 0–0)
PLATELET # BLD AUTO: 256 K/UL — SIGNIFICANT CHANGE UP (ref 150–400)
POTASSIUM SERPL-MCNC: 4.2 MMOL/L — SIGNIFICANT CHANGE UP (ref 3.5–5.3)
POTASSIUM SERPL-SCNC: 4.2 MMOL/L — SIGNIFICANT CHANGE UP (ref 3.5–5.3)
PROTHROM AB SERPL-ACNC: 27.5 SEC — HIGH (ref 9.9–13.4)
RBC # BLD: 4.25 M/UL — SIGNIFICANT CHANGE UP (ref 3.8–5.2)
RBC # FLD: 13.9 % — SIGNIFICANT CHANGE UP (ref 10.3–14.5)
SODIUM SERPL-SCNC: 140 MMOL/L — SIGNIFICANT CHANGE UP (ref 135–145)
WBC # BLD: 8.5 K/UL — SIGNIFICANT CHANGE UP (ref 3.8–10.5)
WBC # FLD AUTO: 8.5 K/UL — SIGNIFICANT CHANGE UP (ref 3.8–10.5)

## 2025-01-05 PROCEDURE — 85027 COMPLETE CBC AUTOMATED: CPT

## 2025-01-05 PROCEDURE — 99285 EMERGENCY DEPT VISIT HI MDM: CPT

## 2025-01-05 PROCEDURE — 84146 ASSAY OF PROLACTIN: CPT

## 2025-01-05 PROCEDURE — 83605 ASSAY OF LACTIC ACID: CPT

## 2025-01-05 PROCEDURE — 83735 ASSAY OF MAGNESIUM: CPT

## 2025-01-05 PROCEDURE — 82947 ASSAY GLUCOSE BLOOD QUANT: CPT

## 2025-01-05 PROCEDURE — 85014 HEMATOCRIT: CPT

## 2025-01-05 PROCEDURE — 82803 BLOOD GASES ANY COMBINATION: CPT

## 2025-01-05 PROCEDURE — 85025 COMPLETE CBC W/AUTO DIFF WBC: CPT

## 2025-01-05 PROCEDURE — 82435 ASSAY OF BLOOD CHLORIDE: CPT

## 2025-01-05 PROCEDURE — 95716 VEEG EA 12-26HR CONT MNTR: CPT

## 2025-01-05 PROCEDURE — 84702 CHORIONIC GONADOTROPIN TEST: CPT

## 2025-01-05 PROCEDURE — 85730 THROMBOPLASTIN TIME PARTIAL: CPT

## 2025-01-05 PROCEDURE — 84100 ASSAY OF PHOSPHORUS: CPT

## 2025-01-05 PROCEDURE — 70450 CT HEAD/BRAIN W/O DYE: CPT | Mod: MC

## 2025-01-05 PROCEDURE — 82550 ASSAY OF CK (CPK): CPT

## 2025-01-05 PROCEDURE — 80048 BASIC METABOLIC PNL TOTAL CA: CPT

## 2025-01-05 PROCEDURE — 85018 HEMOGLOBIN: CPT

## 2025-01-05 PROCEDURE — 71046 X-RAY EXAM CHEST 2 VIEWS: CPT

## 2025-01-05 PROCEDURE — 36415 COLL VENOUS BLD VENIPUNCTURE: CPT

## 2025-01-05 PROCEDURE — 84295 ASSAY OF SERUM SODIUM: CPT

## 2025-01-05 PROCEDURE — 95700 EEG CONT REC W/VID EEG TECH: CPT

## 2025-01-05 PROCEDURE — 84132 ASSAY OF SERUM POTASSIUM: CPT

## 2025-01-05 PROCEDURE — 80053 COMPREHEN METABOLIC PANEL: CPT

## 2025-01-05 PROCEDURE — 82330 ASSAY OF CALCIUM: CPT

## 2025-01-05 PROCEDURE — 85610 PROTHROMBIN TIME: CPT

## 2025-01-05 RX ORDER — GINKGO BILOBA 40 MG
1 CAPSULE ORAL
Qty: 0 | Refills: 0 | DISCHARGE
Start: 2025-01-05

## 2025-01-05 RX ORDER — WARFARIN SODIUM 10 MG/1
5 TABLET ORAL AT BEDTIME
Refills: 0 | Status: DISCONTINUED | OUTPATIENT
Start: 2025-01-05 | End: 2025-01-05

## 2025-01-05 RX ADMIN — Medication 1 MILLIGRAM(S): at 13:01

## 2025-01-05 RX ADMIN — TRAMADOL HYDROCHLORIDE 50 MILLIGRAM(S): 50 TABLET ORAL at 08:40

## 2025-01-05 RX ADMIN — PREGABALIN 200 MILLIGRAM(S): 100 CAPSULE ORAL at 06:26

## 2025-01-05 RX ADMIN — LEVOTHYROXINE SODIUM 50 MICROGRAM(S): 175 TABLET ORAL at 05:17

## 2025-01-05 RX ADMIN — ACETAMINOPHEN 650 MILLIGRAM(S): 80 SOLUTION/ DROPS ORAL at 06:26

## 2025-01-05 RX ADMIN — PREGABALIN 200 MILLIGRAM(S): 100 CAPSULE ORAL at 13:01

## 2025-01-05 RX ADMIN — HYDROXYCHLOROQUINE SULFATE 200 MILLIGRAM(S): 200 TABLET ORAL at 13:02

## 2025-01-05 RX ADMIN — Medication 1000 UNIT(S): at 13:01

## 2025-01-05 RX ADMIN — TRAMADOL HYDROCHLORIDE 50 MILLIGRAM(S): 50 TABLET ORAL at 08:06

## 2025-01-05 RX ADMIN — ACETAMINOPHEN 650 MILLIGRAM(S): 80 SOLUTION/ DROPS ORAL at 07:10

## 2025-01-05 NOTE — DISCHARGE NOTE PROVIDER - PROVIDER TOKENS
PROVIDER:[TOKEN:[[id number]],FOLLOWUP:[1 month]],PROVIDER:[TOKEN:[[id number]],FOLLOWUP:[1 month]]

## 2025-01-05 NOTE — DISCHARGE NOTE PROVIDER - NSDCCPCAREPLAN_GEN_ALL_CORE_FT
PRINCIPAL DISCHARGE DIAGNOSIS  Diagnosis: Seizure  Assessment and Plan of Treatment: You presented after seizure like episode in the setting of insominia and recently discontinuing zolpidem. You had video EEG (electroencephalogram) monitoring which, for the time you were observed, did not reeval any seizures or risk of seizures. The seizure was likely provoked by suddenly stopping the medication and not sleeping well. Going foward, if you plan to stop the Zolpidem we suggest taking 3-4 dayd of a half dose and then 3-4 days of a quarter dose before stopping. We recommend non-pharmalogical methods to help sleep such as mindfulness exercises, only using the bedroom for sleep, turning off all devices. If needed, melatonin (over the counter, can do 2.5mg or 3mg to start) ~1 hour before bed. We recommend you get an MRI done in the outpatient setting to exclude other abnormalities and follow up in our clinic.

## 2025-01-05 NOTE — DISCHARGE NOTE NURSING/CASE MANAGEMENT/SOCIAL WORK - PATIENT PORTAL LINK FT
You can access the FollowMyHealth Patient Portal offered by [facility] by registering at the following website: [email or web address]. By joining [employer]’s FollowMyHealth portal, you will also be able to view your health information using other applications (apps) compatible with our system.

## 2025-01-05 NOTE — DISCHARGE NOTE PROVIDER - CARE PROVIDER_API CALL
Trevor Garcia.  Neurology  [address], [address]  [place] [address]  Phone: [phone]  Fax: [phone]  Follow Up Time: 1 month    Ivory Reed  NP in [facility]  [address], [address]  [place] [address]  Phone: [phone]  Fax: [phone]  Follow Up Time: 1 month

## 2025-01-05 NOTE — DISCHARGE NOTE PROVIDER - HOSPITAL COURSE
History of Present Illness:   28 yo female with PMH lupus, APLS, RA, HTN, HLD, presents with episode of seizure like activity witnessed by sister this morning. Sister reports she heard a thud and saw patient on the floor, arms contracted and shaking, eyes open, and foaming at the mouth. Episode lasted 6 minutes, and patient was confused for another 10 min before coming back to her normal self. Denies tongue bite, urinary incontinence. She reports she took an extra dose of her tramadol the previous night for her chronic knee pain. Patient denies history of seizure, family history of seizure, head trauma, meningitis, or developmental delays. Patient also endorse history of insomnia, and has been only sleeping around 2 hours a day.     Hospital course:  Monitored off antiseizure medications  MRI deferred to outpatient setting  EEG 1/3-1/4 Normal. 1-3 second long bifrontal discharges during sleep, of uncertain significance, runs of vertex waves favored over epileptiform findings

## 2025-01-05 NOTE — DISCHARGE NOTE PROVIDER - CARE PROVIDERS DIRECT ADDRESSES
[email or web address]

## 2025-01-05 NOTE — DISCHARGE NOTE PROVIDER - NSDCMRMEDTOKEN_GEN_ALL_CORE_FT
Ambien 5 mg oral tablet: 1 tab(s) orally once a day (at bedtime), As needed, Insomnia  atorvastatin: 20 milligram(s) orally once a day (at bedtime)  cholecalciferol oral tablet: 1000 unit(s) orally once a day  Coumadin 5 mg oral tablet: 1 tab(s) orally once a day (at bedtime)  folic acid 1 mg oral tablet: 1 tab(s) orally once a day  hydroxychloroquine 200 mg oral tablet: 2 tab(s) orally once a day  levothyroxine 50 mcg (0.05 mg) oral tablet: 1 tab(s) orally once a day  melatonin 3 mg oral tablet: 1 tab(s) orally once a day (at bedtime)  meloxicam 15 mg oral tablet: 1 tab(s) orally once a day  pregabalin 200 mg oral capsule: 1 cap(s) orally 3 times a day  ramipril 1.25 mg oral tablet: 1 orally once a day  Valium 5 mg oral tablet: 1 tab(s) orally 2 times a day MDD:10mg

## 2025-01-05 NOTE — PROGRESS NOTE ADULT - SUBJECTIVE AND OBJECTIVE BOX
~~~~~~~~~~~~~~~~~~~~~~~~~~~~~~~~~~~~~~~~~~~~~~~~~~  Neurology Service   [facility] Epilepsy Monitoring Unit- [place] [id number]  ~~~~~~~~~~~~~~~~~~~~~~~~~~~~~~~~~~~~~~~~~~~~~~~~~~  History:    INCOMPLETE  Interval History:  97/59 asymptomatic, will clarify indication for ACE    Review of Systems:    INCOMPLETE    CONSTITUTIONAL: No fevers or chills  EYES AND ENT: No visual changes or no throat pain   NECK: No pain or stiffness  RESPIRATORY: No hemoptysis or shortness of breath  CARDIOVASCULAR: No chest pain or palpitations  GASTROINTESTINAL: No melena or hematochezia  GENITOURINARY: No dysuria or hematuria  NEUROLOGICAL: +As stated in HPI above  SKIN: No itching, burning, rashes, or lesions   All other review of systems is negative unless indicated above.    Medications  Home Medications:  Ambien 5 mg oral tablet: 1 tab(s) orally once a day (at bedtime), As needed, Insomnia (03 Jan 2025 16:27)  atorvastatin: 20 milligram(s) orally once a day (at bedtime) (03 Jan 2025 16:22)  cholecalciferol oral tablet: 1000 unit(s) orally once a day (03 Jan 2025 16:27)  Coumadin 5 mg oral tablet: 1 tab(s) orally once a day (at bedtime) (03 Jan 2025 16:27)  folic acid 1 mg oral tablet: 1 tab(s) orally once a day (03 Jan 2025 16:27)  hydroxychloroquine 200 mg oral tablet: 2 tab(s) orally once a day (03 Jan 2025 16:27)  levothyroxine 50 mcg (0.05 mg) oral tablet: 1 tab(s) orally once a day (03 Jan 2025 16:24)  meloxicam 15 mg oral tablet: 1 tab(s) orally once a day (03 Jan 2025 16:27)  pregabalin 200 mg oral capsule: 1 cap(s) orally 3 times a day (03 Jan 2025 16:25)  ramipril 1.25 mg oral tablet: 1 orally once a day (03 Jan 2025 16:28)    MEDICATIONS  (STANDING):  atorvastatin 20 milliGRAM(s) Oral at bedtime  cholecalciferol 1000 Unit(s) Oral daily  folic acid 1 milliGRAM(s) Oral daily  hydroxychloroquine 200 milliGRAM(s) Oral daily  levothyroxine 50 MICROGram(s) Oral daily  lisinopril 5 milliGRAM(s) Oral daily  melatonin 3 milliGRAM(s) Oral at bedtime  pregabalin 200 milliGRAM(s) Oral every 8 hours    MEDICATIONS  (PRN):  acetaminophen     Tablet .. 650 milliGRAM(s) Oral every 6 hours PRN Mild Pain (1 - 3), Moderate Pain (4 - 6)  levETIRAcetam   Injectable 1000 milliGRAM(s) IV Push once PRN GTC refractory to ativan  LORazepam   Injectable 2 milliGRAM(s) IV Push once PRN Seizure Activity  traMADol 50 milliGRAM(s) Oral every 8 hours PRN Severe Pain (7 - 10)  zolpidem 10 milliGRAM(s) Oral at bedtime PRN Insomnia      Exam:  Vitals:  Vital Signs Last 24 Hrs  T(C): 36.6 (01-05-25 @ 04:44), Max: 37 (01-05-25 @ 00:36)  T(F): 97.9 (01-05-25 @ 04:44), Max: 98.6 (01-05-25 @ 00:36)  HR: 74 (01-05-25 @ 04:44) (74 - 90)  BP: 98/62 (01-05-25 @ 04:44) (93/57 - 102/68)  BP(mean): --  RR: 18 (01-05-25 @ 04:44) (18 - 18)  SpO2: 96% (01-05-25 @ 04:44) (95% - 99%)      I&O's Summary      INCOMPLETE  Physical Examination:  General - non-toxic appearing female in no acute distress, EEG leads in place    Neurologic Exam:  Mental status - Awake, Alert, Oriented to person, place, and time. Speech fluent, repetition and naming intact. Follows simple and complex commands. Attention/concentration, recent and remote memory (including registration 3/3 and recall 3/3), and fund of knowledge intact    Cranial nerves - PERRLA (4mm -> 3mm b/l), VFF, EOMI, face sensation (V1-V3) intact b/l, facial strength intact without asymmetry b/l, hearing intact b/l, palate with symmetric elevation, trapezius/sternocleidomastiod 5/5 strength b/l, tongue midline on protrusion with full lateral movement    Motor - Normal bulk and tone throughout. No pronator drift.    Strength testing                              Right           Left  Should-Abduct       5                   5  Elbow-Flex             5                   5  Elbow-Ext              5                   5  Wrist-Flex              5                   5  Wrist-Ext               5                   5                        5                   5    Hip-Flex                 5                   5  Hip-Ext                  5                   5  Knee-Flex              5                   5  Knee-Ext                5                   5  Dorsiflex                5                   5  Plantarflex             5                   5    Sensation - Light touch intact throughout    Reflexes:                                             Right             Left  Triceps                     2+                2+  Biceps                      2+                2+  Brachioradialis          2+                2+  Patellar                    2+                 2+  Achilles                    2+                 2+  Plantar                   Down            Down    Coordination - Finger to Nose intact b/l. HKS intact bilaterally. No tremors appreciated.    Gait and station - Deferred today    Objective Studies  Labs:                          12.6   4.37  )-----------( 228      ( 04 Jan 2025 06:12 )             40.4     01-05    140  |  105  |  23  ----------------------------<  97  4.2   |  22  |  0.54    Ca    9.2      05 Jan 2025 07:19  Phos  3.2     01-04  Mg     2.1     01-04     ~~~~~~~~~~~~~~~~~~~~~~~~~~~~~~~~~~~~~~~~~~~~~~~~~~  Neurology Service   [facility] Epilepsy Monitoring Unit- [place] [id number]  ~~~~~~~~~~~~~~~~~~~~~~~~~~~~~~~~~~~~~~~~~~~~~~~~~~  History:  Interval History:  97/59 asymptomatic, will clarify indication for ACE  expands on extent of sleep deprivation prior to event     Review of Systems:    CONSTITUTIONAL: No fevers or chills  EYES AND ENT: No visual changes or no throat pain   NECK: No pain or stiffness  RESPIRATORY: No hemoptysis or shortness of breath  CARDIOVASCULAR: No chest pain or palpitations  GASTROINTESTINAL: No melena or hematochezia  GENITOURINARY: No dysuria or hematuria  NEUROLOGICAL: +As stated in HPI above  SKIN: No itching, burning, rashes, or lesions   All other review of systems is negative unless indicated above.    Medications  Home Medications:  Ambien 5 mg oral tablet: 1 tab(s) orally once a day (at bedtime), As needed, Insomnia (03 Jan 2025 16:27)  atorvastatin: 20 milligram(s) orally once a day (at bedtime) (03 Jan 2025 16:22)  cholecalciferol oral tablet: 1000 unit(s) orally once a day (03 Jan 2025 16:27)  Coumadin 5 mg oral tablet: 1 tab(s) orally once a day (at bedtime) (03 Jan 2025 16:27)  folic acid 1 mg oral tablet: 1 tab(s) orally once a day (03 Jan 2025 16:27)  hydroxychloroquine 200 mg oral tablet: 2 tab(s) orally once a day (03 Jan 2025 16:27)  levothyroxine 50 mcg (0.05 mg) oral tablet: 1 tab(s) orally once a day (03 Jan 2025 16:24)  meloxicam 15 mg oral tablet: 1 tab(s) orally once a day (03 Jan 2025 16:27)  pregabalin 200 mg oral capsule: 1 cap(s) orally 3 times a day (03 Jan 2025 16:25)  ramipril 1.25 mg oral tablet: 1 orally once a day (03 Jan 2025 16:28)    MEDICATIONS  (STANDING):  atorvastatin 20 milliGRAM(s) Oral at bedtime  cholecalciferol 1000 Unit(s) Oral daily  folic acid 1 milliGRAM(s) Oral daily  hydroxychloroquine 200 milliGRAM(s) Oral daily  levothyroxine 50 MICROGram(s) Oral daily  lisinopril 5 milliGRAM(s) Oral daily  melatonin 3 milliGRAM(s) Oral at bedtime  pregabalin 200 milliGRAM(s) Oral every 8 hours    MEDICATIONS  (PRN):  acetaminophen     Tablet .. 650 milliGRAM(s) Oral every 6 hours PRN Mild Pain (1 - 3), Moderate Pain (4 - 6)  levETIRAcetam   Injectable 1000 milliGRAM(s) IV Push once PRN GTC refractory to ativan  LORazepam   Injectable 2 milliGRAM(s) IV Push once PRN Seizure Activity  traMADol 50 milliGRAM(s) Oral every 8 hours PRN Severe Pain (7 - 10)  zolpidem 10 milliGRAM(s) Oral at bedtime PRN Insomnia      Exam:  Vitals:  Vital Signs Last 24 Hrs  T(C): 36.6 (01-05-25 @ 04:44), Max: 37 (01-05-25 @ 00:36)  T(F): 97.9 (01-05-25 @ 04:44), Max: 98.6 (01-05-25 @ 00:36)  HR: 74 (01-05-25 @ 04:44) (74 - 90)  BP: 98/62 (01-05-25 @ 04:44) (93/57 - 102/68)  BP(mean): --  RR: 18 (01-05-25 @ 04:44) (18 - 18)  SpO2: 96% (01-05-25 @ 04:44) (95% - 99%)      I&O's Summary      Physical Examination:  General - non-toxic appearing female in no acute distress, off EEG  Neurologic Exam:  Mental status - Awake, Alert, Oriented to person, place, and time. Speech fluent, repetition and naming intact. Follows simple and complex commands. Attention/concentration, recent and remote memory (including registration 3/3 and recall 3/3), and fund of knowledge intact    Cranial nerves - PERRL, VFF, EOMI, face sensation (V1-V3) intact b/l, facial strength intact without asymmetry b/l, hearing intact b/l, palate with symmetric elevation, trapezius/sternocleidomastiod 5/5 strength b/l, tongue midline on protrusion with full lateral movement  Motor - Normal bulk and tone throughout. No pronator drift.  Strength testing 5/5 throughout   Sensation - Light touch intact throughout  Reflexes:                                             Right             Left  Triceps                     2+                2+  Biceps                      2+                2+  Brachioradialis          2+                2+  Patellar                    2+                 2+  Achilles                    2+                 2+  Plantar                   Down            Down  Coordination - Finger to Nose intact b/l. HKS intact bilaterally. No tremors appreciated.  Gait and station - Deferred today    Objective Studies  Labs:                          12.6   4.37  )-----------( 228      ( 04 Jan 2025 06:12 )             40.4     01-05    140  |  105  |  23  ----------------------------<  97  4.2   |  22  |  0.54    Ca    9.2      05 Jan 2025 07:19  Phos  3.2     01-04  Mg     2.1     01-04 1/4  -----------------------------------------------------------------------------------------------------------  EEG CLASSIFICATION:  Normal EEG in the awake, drowsy and asleep states.    -----------------------------------------------------------------------------------------------------------  IMPRESSION/CLINICAL CORRELATE:  This is a normal VEEG. No epileptiform pattern or seizures were recorded  1-3 second long bifrontal discharges during sleep, of uncertain significance, runs of vertex waves favored over epileptiform findings

## 2025-01-05 NOTE — DISCHARGE NOTE PROVIDER - NSDCCPTREATMENT_GEN_ALL_CORE_FT
PRINCIPAL PROCEDURE  Procedure: EEG awake and asleep  Findings and Treatment: -----------------------------------------------------------------------------------------------------------  EEG CLASSIFICATION:  Normal EEG in the awake, drowsy and asleep states.  -----------------------------------------------------------------------------------------------------------  IMPRESSION/CLINICAL CORRELATE:  This is a normal VEEG. No epileptiform pattern or seizures were recorded  1-3 second long bifrontal discharges during sleep, of uncertain significance, runs of vertex waves favored over epileptiform findings  -----------------------------------------------------------------------------------------------------------

## 2025-01-05 NOTE — DISCHARGE NOTE NURSING/CASE MANAGEMENT/SOCIAL WORK - FINANCIAL ASSISTANCE
[facility] provides services at a reduced cost to those who are determined to be eligible through [facility]’s financial assistance program. Information regarding [facility]’s financial assistance program can be found by going to [email or web address] or by calling [phone].

## 2025-01-05 NOTE — PROGRESS NOTE ADULT - ASSESSMENT
30 yo female with PMH lupus, APLS, RA, HTN, HLD, presents 1/3/2024, with episode of seizure like activity witnessed by sister this morning. Sister reports she heard a thud and saw patient on the floor, arms contracted and shaking, eyes open, and foaming at the mouth. Episode lasted 6 minutes, and patient was confused for another 10 min before coming back to her normal self. Denies tongue bite, urinary incontinence. She reports she took an extra dose of her tramadol the previous night for her chronic knee pain. Patient denies history of seizure, family history of seizure, head trauma, meningitis, or developmental delays. Patient also endorses history of insomnia, and has been only sleeping around 2 hours a day.     Impression: First time episode of GTC, no clear risk factors, currently back to baseline with no neurological deficits. EEG with 1-3 second long bifrontal discharges during sleep - likely runs of vertex waves vs. less likely epileptiform findings    Plan:  [] No antiseizure medication indicated at this time  [] MRI brain w/wo contrast (epilepsy protocol), MRA head/neck  [] vEEG - currently equivocal - continue for another 24 hours  [] continue home dose of zolpidem, avoid sudden discontinuation  [] add melatonin 3 mg qhs, given 2 hr before bedtime  [] can continue tramadol 50 mg q8h PRN (max 150 mg per day)  [] Continue home warfarin for APS (goal 2.0-3.0) - dose daily based on AM INR  [] patient does not take NSAIDs  [] patient reported taking milk thistle - this is not FDA-approved for treatment of any condition. no clear benefit. no clear safety risk in this patient (caution in patients with diabetes). limited data.  [] epilepsy clinic  [] sleep clinic  [] psychology referral for cognitive-behavioral therapy for insomnia  [] follow up with her rheumatologist, nephrologist, urologist after discharge  [] Seizure precautions discussed with patient: No driving, operating heavy machinery, swimming unsupervised, working or playing at unprotected heights, standing near edge of subway platform, or biking in traffic   28 yo female with PMH lupus, APLS, RA, HTN, HLD, presents 1/3/2024, with episode of seizure like activity witnessed by sister this morning. Sister reports she heard a thud and saw patient on the floor, arms contracted and shaking, eyes open, and foaming at the mouth. Episode lasted 6 minutes, and patient was confused for another 10 min before coming back to her normal self. Denies tongue bite, urinary incontinence. She reports she took an extra dose of her tramadol the previous night for her chronic knee pain. Patient denies history of seizure, family history of seizure, head trauma, meningitis, or developmental delays. Patient also endorses history of insomnia, and has been only sleeping around 2 hours a day.     Impression: First time episode of GTC triggered by zolpidem withdrawal and sleep deprivation (no sleeping >1 hour at a time x 4 days)    Plan:  -Pt preferring for MRI outpatient, will DC today off ASMs  [X] Advised on sleep hygiene, mindfulness/calming routines/behavioral modifications   [x] vEEG - negative   [] No antiseizure medication indicated at this time  [] MRI brain w/wo contrast (epilepsy protocol), MRA head/neck - can be outpatient if would otherwise delay discharge   [] continue home dose of zolpidem, avoid sudden discontinuation. Advised that if coming off Zolpidem to half dose x3-4 nights, then half again 3-4 nights, and then stop   [] add melatonin 3 mg qhs, given 2 hr before bedtime  [] continue tramadol 50 mg q8h PRN (max 150 mg per day)  [] epilepsy clinic w/ Dr. Garcia or Ivory   [] sleep clinic  [] psychology referral for cognitive-behavioral therapy for insomnia  [] Continue home warfarin for APS (goal 2.0-3.0) - dose daily based on AM INR  [] patient does not take NSAIDs  [] patient reported taking milk thistle - this is not FDA-approved for treatment of any condition. no clear benefit. no clear safety risk in this patient (caution in patients with diabetes). limited data.  [] follow up with her rheumatologist, nephrologist, urologist after discharge  [] Seizure precautions discussed with patient: No driving, operating heavy machinery, swimming unsupervised, working or playing at unprotected heights, standing near edge of subway platform, or biking in traffic

## 2025-01-06 NOTE — EEG REPORT - NS EEG TEXT BOX
PAPO PEREZ [id number]     Study Date: 01-05-25 08:00-10:40  Duration: 2 hr 40 min  --------------------------------------------------------------------------------------------------  History:  CC/ HPI Patient is a 29y old  Female who presents with a chief complaint of Seizure  (05 Jan 2025 12:45)    MEDICATIONS  (STANDING):    --------------------------------------------------------------------------------------------------  Study Interpretation:    [[[Abbreviation Key:  PDR=alpha rhythm/posterior dominant rhythm. A-P=anterior posterior.  Amplitude: ‘very low’:<20; ‘low’:20-49; ‘medium’:; ‘high’:>150uV.  Persistence for periodic/rhythmic patterns (% of epoch) ‘rare’:<1%; ‘occasional’:1-10%; ‘frequent’:10-50%; ‘abundant’:50-90%; ‘continuous’:>90%.  Persistence for sporadic discharges: ‘rare’:<1/hr; ‘occasional’:1/min-1/hr; ‘frequent’:>1/min; ‘abundant’:>1/10 sec.  RPP=rhythmic and periodic patterns; GRDA=generalized rhythmic delta activity; FIRDA=frontal intermittent GRDA; LRDA=lateralized rhythmic delta activity; TIRDA=temporal intermittent rhythmic delta activity;  LPD=PLED=lateralized periodic discharges; GPD=generalized periodic discharges; BIPDs =bilateral independent periodic discharges; Mf=multifocal; SIRPDs=stimulus induced rhythmic, periodic, or ictal appearing discharges; BIRDs=brief potentially ictal rhythmic discharges >4 Hz, lasting .5-10s; PFA (paroxysmal bursts >13 Hz or =8 Hz <10s).  Modifiers: +F=with fast component; +S=with spike component; +R=with rhythmic component.  S-B=burst suppression pattern.  Max=maximal. N1-drowsy; N2-stage II sleep; N3-slow wave sleep. SSS/BETS=small sharp spikes/benign epileptiform transients of sleep. HV=hyperventilation; PS=photic stimulation]]]    Daily EEG Visual Analysis    FINDINGS:      Background:  Continuity: Continuous  Symmetry: Symmetric  Posterior dominant rhythm (PDR): 8.5 Hz, reactive to eye closure. Symmetric low-amplitude frontal beta in wakefulness.  Voltage: Normal  Anterior-Posterior Gradient: Present  Other background findings: Occasional bursts of diffuse theta activity  Breach: Absent    Background Slowing:  Generalized slowing: None  Focal slowing: No persistent focal slowing    State Changes:   Drowsiness and stage 2 sleep are not captured.    Interictal Findings:  None    Electrographic and Electroclinical seizures:  None    Other Clinical Events:  None    Activation Procedures:   Hyperventilation is not performed.    Photic stimulation is not performed.     Artifacts:  Intermittent myogenic and movement artifacts are present. Fp1-F3 and Fp2-F4 salt bridges. Intermittently disconnected electrodes at times limit interpretation later in recording.    Single-lead EKG: Regular rhythm. Often limited by artifact.    EEG Classification / Summary:  Normal EEG in the awake state.  No epileptiform abnormalities or seizures captured.     Clinical Impression:  Normal EEG.  No epileptiform abnormalities or seizures.          -------------------------------------------------------------------------------------------------------    Laureen Liz MD  Attending Physician, [facility]    -------------------------------------------------------------------------------------------------------    To reach EEG technologist:  Please use the pager number for the appropriate hospital or contact the .  At [facility] - Pager #: [phone]    To reach EEG-reading physician:  EEG Reading Room Phone #: [phone]  Epilepsy Answering Service after 5PM and before 8:30AM: Phone #: [phone]     PAPO PEREZ [id number]     Study Date: 01-05-25 08:00-10:40  Duration: 2 hr 40 min  --------------------------------------------------------------------------------------------------  History:  CC/ HPI Patient is a 29y old  Female who presents with a chief complaint of Seizure  (05 Jan 2025 12:45)    MEDICATIONS  (STANDING):    --------------------------------------------------------------------------------------------------  Study Interpretation:    [[[Abbreviation Key:  PDR=alpha rhythm/posterior dominant rhythm. A-P=anterior posterior.  Amplitude: ‘very low’:<20; ‘low’:20-49; ‘medium’:; ‘high’:>150uV.  Persistence for periodic/rhythmic patterns (% of epoch) ‘rare’:<1%; ‘occasional’:1-10%; ‘frequent’:10-50%; ‘abundant’:50-90%; ‘continuous’:>90%.  Persistence for sporadic discharges: ‘rare’:<1/hr; ‘occasional’:1/min-1/hr; ‘frequent’:>1/min; ‘abundant’:>1/10 sec.  RPP=rhythmic and periodic patterns; GRDA=generalized rhythmic delta activity; FIRDA=frontal intermittent GRDA; LRDA=lateralized rhythmic delta activity; TIRDA=temporal intermittent rhythmic delta activity;  LPD=PLED=lateralized periodic discharges; GPD=generalized periodic discharges; BIPDs =bilateral independent periodic discharges; Mf=multifocal; SIRPDs=stimulus induced rhythmic, periodic, or ictal appearing discharges; BIRDs=brief potentially ictal rhythmic discharges >4 Hz, lasting .5-10s; PFA (paroxysmal bursts >13 Hz or =8 Hz <10s).  Modifiers: +F=with fast component; +S=with spike component; +R=with rhythmic component.  S-B=burst suppression pattern.  Max=maximal. N1-drowsy; N2-stage II sleep; N3-slow wave sleep. SSS/BETS=small sharp spikes/benign epileptiform transients of sleep. HV=hyperventilation; PS=photic stimulation]]]    Daily EEG Visual Analysis    FINDINGS:      Background:  Continuity: Continuous  Symmetry: Symmetric  Posterior dominant rhythm (PDR): 8.5-9 Hz, reactive to eye closure. Symmetric low-amplitude frontal beta in wakefulness.  Voltage: Normal  Anterior-Posterior Gradient: Present  Other background findings: Occasional bursts of diffuse theta activity  Breach: Absent    Background Slowing:  Generalized slowing: None  Focal slowing: No persistent focal slowing    State Changes:   Drowsiness and stage 2 sleep are not captured.    Interictal Findings:  None    Electrographic and Electroclinical seizures:  None    Other Clinical Events:  None    Activation Procedures:   Hyperventilation is not performed.    Photic stimulation is not performed.     Artifacts:  Intermittent myogenic and movement artifacts are present. Fp1-F3 and Fp2-F4 salt bridges. Intermittently disconnected electrodes at times limit interpretation later in recording.    Single-lead EKG: Regular rhythm. Often limited by artifact.    EEG Classification / Summary:  Normal EEG in the awake state.  No epileptiform abnormalities or seizures captured.     Clinical Impression:  Normal EEG.  No epileptiform abnormalities or seizures.          -------------------------------------------------------------------------------------------------------    Laureen Liz MD  Attending Physician, [facility]    -------------------------------------------------------------------------------------------------------    To reach EEG technologist:  Please use the pager number for the appropriate hospital or contact the .  At [facility] - Pager #: [phone]    To reach EEG-reading physician:  EEG Reading Room Phone #: [phone]  Epilepsy Answering Service after 5PM and before 8:30AM: Phone #: [phone]